# Patient Record
Sex: MALE | Race: WHITE | NOT HISPANIC OR LATINO | Employment: FULL TIME | URBAN - METROPOLITAN AREA
[De-identification: names, ages, dates, MRNs, and addresses within clinical notes are randomized per-mention and may not be internally consistent; named-entity substitution may affect disease eponyms.]

---

## 2022-12-12 ENCOUNTER — OFFICE VISIT (OUTPATIENT)
Dept: URGENT CARE | Facility: CLINIC | Age: 37
End: 2022-12-12
Payer: COMMERCIAL

## 2022-12-12 VITALS
BODY MASS INDEX: 30.07 KG/M2 | DIASTOLIC BLOOD PRESSURE: 83 MMHG | HEART RATE: 104 BPM | WEIGHT: 222 LBS | RESPIRATION RATE: 20 BRPM | HEIGHT: 72 IN | TEMPERATURE: 99 F | OXYGEN SATURATION: 96 % | SYSTOLIC BLOOD PRESSURE: 116 MMHG

## 2022-12-12 DIAGNOSIS — M25.552 LEFT HIP PAIN: Primary | ICD-10-CM

## 2022-12-12 PROCEDURE — 1160F RVW MEDS BY RX/DR IN RCRD: CPT | Mod: CPTII,S$GLB,, | Performed by: PHYSICIAN ASSISTANT

## 2022-12-12 PROCEDURE — 3008F PR BODY MASS INDEX (BMI) DOCUMENTED: ICD-10-PCS | Mod: CPTII,S$GLB,, | Performed by: PHYSICIAN ASSISTANT

## 2022-12-12 PROCEDURE — 1160F PR REVIEW ALL MEDS BY PRESCRIBER/CLIN PHARMACIST DOCUMENTED: ICD-10-PCS | Mod: CPTII,S$GLB,, | Performed by: PHYSICIAN ASSISTANT

## 2022-12-12 PROCEDURE — 96372 THER/PROPH/DIAG INJ SC/IM: CPT | Mod: S$GLB,,, | Performed by: PHYSICIAN ASSISTANT

## 2022-12-12 PROCEDURE — 3079F PR MOST RECENT DIASTOLIC BLOOD PRESSURE 80-89 MM HG: ICD-10-PCS | Mod: CPTII,S$GLB,, | Performed by: PHYSICIAN ASSISTANT

## 2022-12-12 PROCEDURE — 99204 OFFICE O/P NEW MOD 45 MIN: CPT | Mod: 25,S$GLB,, | Performed by: PHYSICIAN ASSISTANT

## 2022-12-12 PROCEDURE — 99204 PR OFFICE/OUTPT VISIT, NEW, LEVL IV, 45-59 MIN: ICD-10-PCS | Mod: 25,S$GLB,, | Performed by: PHYSICIAN ASSISTANT

## 2022-12-12 PROCEDURE — 3079F DIAST BP 80-89 MM HG: CPT | Mod: CPTII,S$GLB,, | Performed by: PHYSICIAN ASSISTANT

## 2022-12-12 PROCEDURE — 1159F MED LIST DOCD IN RCRD: CPT | Mod: CPTII,S$GLB,, | Performed by: PHYSICIAN ASSISTANT

## 2022-12-12 PROCEDURE — 3074F PR MOST RECENT SYSTOLIC BLOOD PRESSURE < 130 MM HG: ICD-10-PCS | Mod: CPTII,S$GLB,, | Performed by: PHYSICIAN ASSISTANT

## 2022-12-12 PROCEDURE — 3074F SYST BP LT 130 MM HG: CPT | Mod: CPTII,S$GLB,, | Performed by: PHYSICIAN ASSISTANT

## 2022-12-12 PROCEDURE — 96372 PR INJECTION,THERAP/PROPH/DIAG2ST, IM OR SUBCUT: ICD-10-PCS | Mod: S$GLB,,, | Performed by: PHYSICIAN ASSISTANT

## 2022-12-12 PROCEDURE — 1159F PR MEDICATION LIST DOCUMENTED IN MEDICAL RECORD: ICD-10-PCS | Mod: CPTII,S$GLB,, | Performed by: PHYSICIAN ASSISTANT

## 2022-12-12 PROCEDURE — 3008F BODY MASS INDEX DOCD: CPT | Mod: CPTII,S$GLB,, | Performed by: PHYSICIAN ASSISTANT

## 2022-12-12 RX ORDER — KETOROLAC TROMETHAMINE 30 MG/ML
30 INJECTION, SOLUTION INTRAMUSCULAR; INTRAVENOUS
Status: COMPLETED | OUTPATIENT
Start: 2022-12-12 | End: 2022-12-12

## 2022-12-12 RX ORDER — METHOCARBAMOL 750 MG/1
750 TABLET, FILM COATED ORAL 3 TIMES DAILY
Qty: 30 TABLET | Refills: 0 | Status: SHIPPED | OUTPATIENT
Start: 2022-12-12 | End: 2022-12-22

## 2022-12-12 RX ORDER — KETOROLAC TROMETHAMINE 10 MG/1
10 TABLET, FILM COATED ORAL 2 TIMES DAILY
Qty: 10 TABLET | Refills: 0 | Status: SHIPPED | OUTPATIENT
Start: 2022-12-12 | End: 2022-12-16 | Stop reason: ALTCHOICE

## 2022-12-12 RX ORDER — PREDNISONE 50 MG/1
50 TABLET ORAL DAILY
Qty: 5 TABLET | Refills: 0 | Status: SHIPPED | OUTPATIENT
Start: 2022-12-12 | End: 2023-03-29

## 2022-12-12 RX ADMIN — KETOROLAC TROMETHAMINE 30 MG: 30 INJECTION, SOLUTION INTRAMUSCULAR; INTRAVENOUS at 03:12

## 2022-12-12 NOTE — PROGRESS NOTES
Subjective:       Patient ID: Ty Ruelas is a 37 y.o. male.    Vitals:  height is 6' (1.829 m) and weight is 100.7 kg (222 lb). His temperature is 98.9 °F (37.2 °C). His blood pressure is 116/83 and his pulse is 104. His respiration is 20 and oxygen saturation is 96%.     Chief Complaint: Pain    C/o left side pain, rates pain 6/10, x 1 month, pain starts left side buttocks and generates down the leg, pt has taken OTC meds with little to no help.  He has had negative MRI and nerve conduction study.  States he can't get in to see his PCP until after the first of the year    Pain  This is a recurrent problem. The current episode started 1 to 4 weeks ago. The problem occurs constantly. The problem has been unchanged. Pertinent negatives include no abdominal pain, anorexia, arthralgias, change in bowel habit, chest pain, chills, congestion, coughing, diaphoresis, fatigue, fever, headaches, joint swelling, myalgias, nausea, neck pain, numbness, rash, sore throat, swollen glands, urinary symptoms, vertigo, visual change, vomiting or weakness. He has tried NSAIDs for the symptoms. The treatment provided no relief.     Constitution: Negative for chills, sweating, fatigue and fever.   HENT:  Negative for congestion and sore throat.    Neck: Negative for neck pain.   Cardiovascular:  Negative for chest pain.   Respiratory:  Negative for cough.    Gastrointestinal:  Negative for abdominal pain, nausea and vomiting.   Musculoskeletal:  Negative for joint pain, joint swelling and muscle ache.   Skin:  Negative for rash.   Neurological:  Negative for history of vertigo, headaches and numbness.     Objective:      Physical Exam   Constitutional: He is oriented to person, place, and time. He appears well-developed. He is cooperative.  Non-toxic appearance. He does not appear ill. No distress.   HENT:   Head: Normocephalic and atraumatic.   Ears:   Right Ear: Hearing, tympanic membrane, external ear and ear canal normal.   Left  Ear: Hearing, tympanic membrane, external ear and ear canal normal.   Nose: Nose normal. No mucosal edema, rhinorrhea or nasal deformity. No epistaxis. Right sinus exhibits no maxillary sinus tenderness and no frontal sinus tenderness. Left sinus exhibits no maxillary sinus tenderness and no frontal sinus tenderness.   Mouth/Throat: Uvula is midline, oropharynx is clear and moist and mucous membranes are normal. No trismus in the jaw. Normal dentition. No uvula swelling. No posterior oropharyngeal erythema.   Eyes: Conjunctivae and lids are normal. Right eye exhibits no discharge. Left eye exhibits no discharge. No scleral icterus.   Neck: Trachea normal and phonation normal. Neck supple.   Cardiovascular: Normal rate, regular rhythm, normal heart sounds and normal pulses.   Pulmonary/Chest: Effort normal and breath sounds normal. No respiratory distress.   Abdominal: Normal appearance and bowel sounds are normal. He exhibits no distension and no mass. Soft. There is no abdominal tenderness.   Musculoskeletal: Normal range of motion.         General: No deformity. Normal range of motion.      Left hip: He exhibits normal range of motion, no tenderness, no bony tenderness, no crepitus and no deformity.   Neurological: He is alert and oriented to person, place, and time. He exhibits normal muscle tone. Coordination normal.   Skin: Skin is warm, dry, intact, not diaphoretic and not pale.   Psychiatric: His speech is normal and behavior is normal. Judgment and thought content normal.   Nursing note and vitals reviewed.      Assessment:       1. Left hip pain          Plan:       Discussed medication being prescribed.  Advised patient to follow up with PCP as needed.  Patient verbalized understanding, agrees with the plan, and is comfortable with discharge.    Left hip pain  -     ketorolac injection 30 mg  -     methocarbamoL (ROBAXIN) 750 MG Tab; Take 1 tablet (750 mg total) by mouth 3 (three) times daily. for 10 days   Dispense: 30 tablet; Refill: 0  -     predniSONE (DELTASONE) 50 MG Tab; Take 1 tablet (50 mg total) by mouth once daily.  Dispense: 5 tablet; Refill: 0  -     ketorolac (TORADOL) 10 mg tablet; Take 1 tablet (10 mg total) by mouth 2 (two) times a day. for 5 days  Dispense: 10 tablet; Refill: 0  -     Ambulatory referral/consult to Rheumatology

## 2022-12-16 ENCOUNTER — HOSPITAL ENCOUNTER (OUTPATIENT)
Dept: RADIOLOGY | Facility: HOSPITAL | Age: 37
Discharge: HOME | End: 2022-12-16
Attending: PHYSICIAN ASSISTANT
Payer: COMMERCIAL

## 2022-12-16 ENCOUNTER — OFFICE VISIT (OUTPATIENT)
Dept: RHEUMATOLOGY | Facility: CLINIC | Age: 37
End: 2022-12-16
Payer: COMMERCIAL

## 2022-12-16 ENCOUNTER — PATIENT MESSAGE (OUTPATIENT)
Dept: RHEUMATOLOGY | Facility: CLINIC | Age: 37
End: 2022-12-16

## 2022-12-16 VITALS
SYSTOLIC BLOOD PRESSURE: 135 MMHG | BODY MASS INDEX: 30.52 KG/M2 | WEIGHT: 225.31 LBS | HEART RATE: 108 BPM | HEIGHT: 72 IN | DIASTOLIC BLOOD PRESSURE: 81 MMHG

## 2022-12-16 DIAGNOSIS — M53.3 CHRONIC LEFT SI JOINT PAIN: ICD-10-CM

## 2022-12-16 DIAGNOSIS — L40.0 PLAQUE PSORIASIS: ICD-10-CM

## 2022-12-16 DIAGNOSIS — G89.29 CHRONIC LEFT SI JOINT PAIN: ICD-10-CM

## 2022-12-16 DIAGNOSIS — M25.552 LEFT HIP PAIN: Primary | ICD-10-CM

## 2022-12-16 DIAGNOSIS — M25.552 LEFT HIP PAIN: ICD-10-CM

## 2022-12-16 PROCEDURE — 73502 XR HIP WITH PELVIS WHEN PERFORMED, 2 OR 3 VIEWS LEFT: ICD-10-PCS | Mod: 26,LT,, | Performed by: RADIOLOGY

## 2022-12-16 PROCEDURE — 99999 PR PBB SHADOW E&M-EST. PATIENT-LVL IV: CPT | Mod: PBBFAC,,, | Performed by: PHYSICIAN ASSISTANT

## 2022-12-16 PROCEDURE — 72202 X-RAY EXAM SI JOINTS 3/> VWS: CPT | Mod: 26,,, | Performed by: RADIOLOGY

## 2022-12-16 PROCEDURE — 72202 XR SACROILIAC JOINTS COMPLETE: ICD-10-PCS | Mod: 26,,, | Performed by: RADIOLOGY

## 2022-12-16 PROCEDURE — 3075F SYST BP GE 130 - 139MM HG: CPT | Mod: CPTII,S$GLB,, | Performed by: PHYSICIAN ASSISTANT

## 2022-12-16 PROCEDURE — 3079F PR MOST RECENT DIASTOLIC BLOOD PRESSURE 80-89 MM HG: ICD-10-PCS | Mod: CPTII,S$GLB,, | Performed by: PHYSICIAN ASSISTANT

## 2022-12-16 PROCEDURE — 99999 PR PBB SHADOW E&M-EST. PATIENT-LVL IV: ICD-10-PCS | Mod: PBBFAC,,, | Performed by: PHYSICIAN ASSISTANT

## 2022-12-16 PROCEDURE — 73502 X-RAY EXAM HIP UNI 2-3 VIEWS: CPT | Mod: 26,LT,, | Performed by: RADIOLOGY

## 2022-12-16 PROCEDURE — 3079F DIAST BP 80-89 MM HG: CPT | Mod: CPTII,S$GLB,, | Performed by: PHYSICIAN ASSISTANT

## 2022-12-16 PROCEDURE — 3008F BODY MASS INDEX DOCD: CPT | Mod: CPTII,S$GLB,, | Performed by: PHYSICIAN ASSISTANT

## 2022-12-16 PROCEDURE — 3075F PR MOST RECENT SYSTOLIC BLOOD PRESS GE 130-139MM HG: ICD-10-PCS | Mod: CPTII,S$GLB,, | Performed by: PHYSICIAN ASSISTANT

## 2022-12-16 PROCEDURE — 73502 X-RAY EXAM HIP UNI 2-3 VIEWS: CPT | Mod: TC,LT

## 2022-12-16 PROCEDURE — 3008F PR BODY MASS INDEX (BMI) DOCUMENTED: ICD-10-PCS | Mod: CPTII,S$GLB,, | Performed by: PHYSICIAN ASSISTANT

## 2022-12-16 PROCEDURE — 1160F RVW MEDS BY RX/DR IN RCRD: CPT | Mod: CPTII,S$GLB,, | Performed by: PHYSICIAN ASSISTANT

## 2022-12-16 PROCEDURE — 72202 X-RAY EXAM SI JOINTS 3/> VWS: CPT | Mod: TC

## 2022-12-16 PROCEDURE — 99203 OFFICE O/P NEW LOW 30 MIN: CPT | Mod: S$GLB,,, | Performed by: PHYSICIAN ASSISTANT

## 2022-12-16 PROCEDURE — 1159F MED LIST DOCD IN RCRD: CPT | Mod: CPTII,S$GLB,, | Performed by: PHYSICIAN ASSISTANT

## 2022-12-16 PROCEDURE — 1160F PR REVIEW ALL MEDS BY PRESCRIBER/CLIN PHARMACIST DOCUMENTED: ICD-10-PCS | Mod: CPTII,S$GLB,, | Performed by: PHYSICIAN ASSISTANT

## 2022-12-16 PROCEDURE — 99203 PR OFFICE/OUTPT VISIT, NEW, LEVL III, 30-44 MIN: ICD-10-PCS | Mod: S$GLB,,, | Performed by: PHYSICIAN ASSISTANT

## 2022-12-16 PROCEDURE — 1159F PR MEDICATION LIST DOCUMENTED IN MEDICAL RECORD: ICD-10-PCS | Mod: CPTII,S$GLB,, | Performed by: PHYSICIAN ASSISTANT

## 2022-12-16 RX ORDER — LEMBOREXANT 10 MG/1
1 TABLET, FILM COATED ORAL NIGHTLY
COMMUNITY
Start: 2022-12-06

## 2022-12-16 RX ORDER — VORTIOXETINE 20 MG/1
1 TABLET, FILM COATED ORAL
COMMUNITY
Start: 2022-12-07

## 2022-12-16 RX ORDER — ATOMOXETINE 25 MG/1
25 CAPSULE ORAL
COMMUNITY
Start: 2022-12-07

## 2022-12-16 RX ORDER — MELOXICAM 15 MG/1
15 TABLET ORAL DAILY
Qty: 30 TABLET | Refills: 2 | Status: SHIPPED | OUTPATIENT
Start: 2022-12-16 | End: 2023-03-29

## 2022-12-16 NOTE — PROGRESS NOTES
Subjective:      Patient ID: Ty Ruelas is a 37 y.o. male.    Chief Complaint: Hip Pain      HPI   Ty Ruelas  is a 37 y.o. male who comes in today for rheumatology evaluation.  Referred by [facility] for left hip pain in conjunction with plaque psoriasis.  He was diagnosed with psoriasis in the last couple of years.  He follows with an external dermatologist.  It is biopsy proven psoriasis.  Currently on a steroid cream which she says is not helpful.  He has not been back to follow-up with them.    Since July he started developing left-sided hip pain.  He points laterally as well as into the groin is to wear pain is located.  To a lesser extent has pain posteriorly in left SI joint.  About a year and some change ago he had also been diagnosed with Achilles tendinitis on the right side.  Denies nail pitting, history of psoriatic arthritis in the family as well as personal history of dactylitis.      Patient denies fevers, chills, photosensitivity, eye pain, shortness of breath, chest pain, hematuria, blood in the stool, sicca symptoms, raynauds, finger ulcerations.  Rheumatologic systems otherwise negative.    Serologies/Labs:  none  Current Treatment:  Steroid cream  Prednisone 50 mg (from [facility])  Robaxin  Toradol  Previous Treatment:   na      Current Outpatient Medications:     atomoxetine (STRATTERA) 25 MG capsule, Take 25 mg by mouth., Disp: , Rfl:     DAYVIGO 10 mg Tab, Take 1 tablet by mouth every evening., Disp: , Rfl:     methocarbamoL (ROBAXIN) 750 MG Tab, Take 1 tablet (750 mg total) by mouth 3 (three) times daily. for 10 days, Disp: 30 tablet, Rfl: 0    predniSONE (DELTASONE) 50 MG Tab, Take 1 tablet (50 mg total) by mouth once daily., Disp: 5 tablet, Rfl: 0    TRINTELLIX 20 mg Tab, Take 1 tablet by mouth., Disp: , Rfl:     meloxicam (MOBIC) 15 MG tablet, Take 1 tablet (15 mg total) by mouth once daily., Disp: 30 tablet, Rfl: 2    Past Medical History:   Diagnosis Date     Diabetes mellitus, type 2     Psoriasis      History reviewed. No pertinent family history.  Social History     Socioeconomic History    Marital status:    Tobacco Use    Smoking status: Never    Smokeless tobacco: Never   Substance and Sexual Activity    Alcohol use: Never    Drug use: Never    Sexual activity: Yes     Review of patient's allergies indicates:  No Known Allergies    Objective:   /81   Pulse 108   Ht 6' (1.829 m)   Wt 102.2 kg (225 lb 5 oz)   BMI 30.56 kg/m²     There is no immunization history on file for this patient.    Physical Exam   Constitutional: He is oriented to person, place, and time. No distress.   HENT:   Head: Normocephalic and atraumatic.   Pulmonary/Chest: Effort normal.   Musculoskeletal:         General: Normal range of motion.   Neurological: He is alert and oriented to person, place, and time.   Skin: Rash (psoriasis bilateral forearms) noted.   Psychiatric: His behavior is normal. Mood normal.   Nursing note and vitals reviewed.    No dactylitis, no enthesitis  TTP lt GTB, Left SI jt  No joint effusions   100% fist formation  No groin pain with ROM hip  Motor intact to EHL/FHL/GS/TA  Sensation intact to S/S/SPN/DPN/Tib nerve  (-) SLR test        No results found for this or any previous visit (from the past 672 hour(s)).    No results found for: TBGOLDPLUS   No results found for: HAV, HEPAIGM, HEPBIGM, HEPBCAB, HBEAG, HEPCAB       Assessment:     1. Left hip pain    2. Chronic left SI joint pain    3. Plaque psoriasis              Plan:     Ty was seen today for hip pain.    Diagnoses and all orders for this visit:    Left hip pain  -     GABBY Screen w/Reflex; Future  -     Rheumatoid Factor; Future  -     Cyclic Citrullinated Peptide Antibody, IgG; Future  -     CBC Auto Differential; Standing  -     Comprehensive Metabolic Panel; Standing  -     Sedimentation rate; Standing  -     C-Reactive Protein; Standing  -     X-Ray Hip 2 or 3 views Left (with Pelvis  when performed); Future  -     X-Ray Sacroiliac Joints Complete; Future  -     meloxicam (MOBIC) 15 MG tablet; Take 1 tablet (15 mg total) by mouth once daily.    Chronic left SI joint pain  -     CARMELINA Screen w/Reflex; Future  -     Rheumatoid Factor; Future  -     Cyclic Citrullinated Peptide Antibody, IgG; Future  -     CBC Auto Differential; Standing  -     Comprehensive Metabolic Panel; Standing  -     Sedimentation rate; Standing  -     C-Reactive Protein; Standing  -     X-Ray Hip 2 or 3 views Left (with Pelvis when performed); Future  -     X-Ray Sacroiliac Joints Complete; Future  -     meloxicam (MOBIC) 15 MG tablet; Take 1 tablet (15 mg total) by mouth once daily.    Plaque psoriasis  -     CARMELINA Screen w/Reflex; Future  -     Rheumatoid Factor; Future  -     Cyclic Citrullinated Peptide Antibody, IgG; Future  -     CBC Auto Differential; Standing  -     Comprehensive Metabolic Panel; Standing  -     Sedimentation rate; Standing  -     C-Reactive Protein; Standing        Plaque Psoriasis now w oligoarthralgias  Hip xray  today  Reg 4, carmelina, rf, ccp today  Receheck crp and esr in 3 weeks (off steroids)  Mobic 15 mg one daily (start after completion of steroid and toradol - pt to cut last prednisone in half)  Consider GTB injection  Return to clinic: 6-8 weeks    Follow up in about 6 weeks (around 1/27/2023).    The patient understands, chooses and consents to this plan and accepts all the risks which include but are not limited to the risks mentioned above.     Disclaimer: This note was prepared using a voice recognition system and is likely to have sound alike errors within the text.

## 2022-12-22 ENCOUNTER — PATIENT MESSAGE (OUTPATIENT)
Dept: RHEUMATOLOGY | Facility: CLINIC | Age: 37
End: 2022-12-22
Payer: COMMERCIAL

## 2022-12-22 DIAGNOSIS — M25.552 LEFT HIP PAIN: Primary | ICD-10-CM

## 2023-01-30 ENCOUNTER — TELEPHONE (OUTPATIENT)
Dept: RHEUMATOLOGY | Facility: CLINIC | Age: 38
End: 2023-01-30
Payer: COMMERCIAL

## 2023-01-30 NOTE — TELEPHONE ENCOUNTER
"Contacted patient to discuss this with him. Patient verbalized understanding. All questions answered.     Umm Cruz (Allye), [facility]  Rheumatology Department   "

## 2023-01-30 NOTE — TELEPHONE ENCOUNTER
"Returned patients phone call. Patient stated that he is scheduled to see Mrs. Lou Lott" SANA Cotton today 01/30/2023 at 3:00 at [facility] location. He was diagnosed with COVID at the beginning of this year and had to cancel his MRI and Lab work. He is rescheduled for his MRI and labs tomorrow and wanted to know if he should still keep his appointment today with Mrs. Blake"Manoj Cotton PA-C since those were not done. Advised patient that Mrs. Lou Dawkinsnolvia Cotton PA-C needed to have those things done prior to his next appointment so I am going to send her a message to let her know what is going on. Patient thanked me for the return phone call. All questions answered.        Sonali, this patient is scheduled to see you at 3:00 today but did not have labs or his MRI due to him having COVID. MRI and labs are rescheduled to tomorrow. Your next available is not until 02/27/2023. Please advise.      Umm Cruz (Allye), [facility]  Rheumatology Department    "

## 2023-01-30 NOTE — TELEPHONE ENCOUNTER
Lou Cotton, SANA Cruz, [facility]  Caller: Unspecified (Today, 10:23 AM)  Schedule first available and add him to the cancelation list

## 2023-01-30 NOTE — TELEPHONE ENCOUNTER
----- Message from Yoly Hickey sent at 1/30/2023 10:18 AM CST -----  Contact: Ty Crawford is calling to speak to the nurse regarding his appointment scheduled for today 01/30. Before he come he have a few questions. Please contact the patient at [phone]    Thanks  FIDELIA

## 2023-01-31 ENCOUNTER — HOSPITAL ENCOUNTER (OUTPATIENT)
Dept: RADIOLOGY | Facility: HOSPITAL | Age: 38
Discharge: HOME | End: 2023-01-31
Attending: PHYSICIAN ASSISTANT
Payer: COMMERCIAL

## 2023-01-31 DIAGNOSIS — M25.552 LEFT HIP PAIN: ICD-10-CM

## 2023-01-31 PROCEDURE — 73723 MRI HIP W WO CONTRAST LEFT: ICD-10-PCS | Mod: 26,LT,, | Performed by: RADIOLOGY

## 2023-01-31 PROCEDURE — 25500020 PHARM REV CODE 255: Performed by: PHYSICIAN ASSISTANT

## 2023-01-31 PROCEDURE — A9585 GADOBUTROL INJECTION: HCPCS | Performed by: PHYSICIAN ASSISTANT

## 2023-01-31 PROCEDURE — 73723 MRI JOINT LWR EXTR W/O&W/DYE: CPT | Mod: 26,LT,, | Performed by: RADIOLOGY

## 2023-01-31 PROCEDURE — 73723 MRI JOINT LWR EXTR W/O&W/DYE: CPT | Mod: TC,LT

## 2023-01-31 RX ORDER — GADOBUTROL 604.72 MG/ML
10 INJECTION INTRAVENOUS
Status: COMPLETED | OUTPATIENT
Start: 2023-01-31 | End: 2023-01-31

## 2023-01-31 RX ADMIN — GADOBUTROL 10 ML: 604.72 INJECTION INTRAVENOUS at 02:01

## 2023-02-17 ENCOUNTER — PATIENT MESSAGE (OUTPATIENT)
Dept: RHEUMATOLOGY | Facility: CLINIC | Age: 38
End: 2023-02-17
Payer: COMMERCIAL

## 2023-02-27 ENCOUNTER — OFFICE VISIT (OUTPATIENT)
Dept: RHEUMATOLOGY | Facility: CLINIC | Age: 38
End: 2023-02-27
Payer: COMMERCIAL

## 2023-02-27 ENCOUNTER — LAB VISIT (OUTPATIENT)
Dept: LAB | Facility: HOSPITAL | Age: 38
End: 2023-02-27
Attending: PHYSICIAN ASSISTANT
Payer: COMMERCIAL

## 2023-02-27 VITALS
SYSTOLIC BLOOD PRESSURE: 110 MMHG | HEART RATE: 78 BPM | DIASTOLIC BLOOD PRESSURE: 74 MMHG | WEIGHT: 227.94 LBS | BODY MASS INDEX: 30.87 KG/M2 | HEIGHT: 72 IN

## 2023-02-27 DIAGNOSIS — M25.552 LEFT HIP PAIN: ICD-10-CM

## 2023-02-27 DIAGNOSIS — E11.69 TYPE 2 DIABETES MELLITUS WITH OTHER SPECIFIED COMPLICATION, WITHOUT LONG-TERM CURRENT USE OF INSULIN: ICD-10-CM

## 2023-02-27 DIAGNOSIS — Z79.899 HIGH RISK MEDICATION USE: ICD-10-CM

## 2023-02-27 DIAGNOSIS — G89.29 CHRONIC LEFT SI JOINT PAIN: ICD-10-CM

## 2023-02-27 DIAGNOSIS — L40.0 PLAQUE PSORIASIS: ICD-10-CM

## 2023-02-27 DIAGNOSIS — M53.3 CHRONIC LEFT SI JOINT PAIN: ICD-10-CM

## 2023-02-27 DIAGNOSIS — L40.50 PSORIATIC ARTHRITIS: Primary | ICD-10-CM

## 2023-02-27 LAB
ALBUMIN SERPL BCP-MCNC: 4.3 G/DL (ref 3.5–5.2)
ALP SERPL-CCNC: 66 U/L (ref 55–135)
ALT SERPL W/O P-5'-P-CCNC: 28 U/L (ref 10–44)
ANION GAP SERPL CALC-SCNC: 11 MMOL/L (ref 8–16)
AST SERPL-CCNC: 17 U/L (ref 10–40)
BASOPHILS # BLD AUTO: 0.03 K/UL (ref 0–0.2)
BASOPHILS NFR BLD: 0.5 % (ref 0–1.9)
BILIRUB SERPL-MCNC: 0.6 MG/DL (ref 0.1–1)
BUN SERPL-MCNC: 9 MG/DL (ref 6–20)
CALCIUM SERPL-MCNC: 10 MG/DL (ref 8.7–10.5)
CHLORIDE SERPL-SCNC: 102 MMOL/L (ref 95–110)
CO2 SERPL-SCNC: 25 MMOL/L (ref 23–29)
CREAT SERPL-MCNC: 1 MG/DL (ref 0.5–1.4)
CRP SERPL-MCNC: 3.3 MG/L (ref 0–8.2)
DIFFERENTIAL METHOD: NORMAL
EOSINOPHIL # BLD AUTO: 0.1 K/UL (ref 0–0.5)
EOSINOPHIL NFR BLD: 1.8 % (ref 0–8)
ERYTHROCYTE [DISTWIDTH] IN BLOOD BY AUTOMATED COUNT: 12.3 % (ref 11.5–14.5)
ERYTHROCYTE [SEDIMENTATION RATE] IN BLOOD BY PHOTOMETRIC METHOD: 4 MM/HR (ref 0–23)
EST. GFR  (NO RACE VARIABLE): >60 ML/MIN/1.73 M^2
ESTIMATED AVG GLUCOSE: 214 MG/DL (ref 68–131)
GLUCOSE SERPL-MCNC: 293 MG/DL (ref 70–110)
HBA1C MFR BLD: 9.1 % (ref 4–5.6)
HCT VFR BLD AUTO: 45.3 % (ref 40–54)
HGB BLD-MCNC: 15.3 G/DL (ref 14–18)
IMM GRANULOCYTES # BLD AUTO: 0.01 K/UL (ref 0–0.04)
IMM GRANULOCYTES NFR BLD AUTO: 0.2 % (ref 0–0.5)
LYMPHOCYTES # BLD AUTO: 2.3 K/UL (ref 1–4.8)
LYMPHOCYTES NFR BLD: 36.4 % (ref 18–48)
MCH RBC QN AUTO: 29 PG (ref 27–31)
MCHC RBC AUTO-ENTMCNC: 33.8 G/DL (ref 32–36)
MCV RBC AUTO: 86 FL (ref 82–98)
MONOCYTES # BLD AUTO: 0.4 K/UL (ref 0.3–1)
MONOCYTES NFR BLD: 6.7 % (ref 4–15)
NEUTROPHILS # BLD AUTO: 3.4 K/UL (ref 1.8–7.7)
NEUTROPHILS NFR BLD: 54.4 % (ref 38–73)
NRBC BLD-RTO: 0 /100 WBC
PLATELET # BLD AUTO: 249 K/UL (ref 150–450)
PMV BLD AUTO: 10.3 FL (ref 9.2–12.9)
POTASSIUM SERPL-SCNC: 4.6 MMOL/L (ref 3.5–5.1)
PROT SERPL-MCNC: 7.6 G/DL (ref 6–8.4)
RBC # BLD AUTO: 5.28 M/UL (ref 4.6–6.2)
SODIUM SERPL-SCNC: 138 MMOL/L (ref 136–145)
WBC # BLD AUTO: 6.26 K/UL (ref 3.9–12.7)

## 2023-02-27 PROCEDURE — 3078F DIAST BP <80 MM HG: CPT | Mod: CPTII,S$GLB,, | Performed by: PHYSICIAN ASSISTANT

## 2023-02-27 PROCEDURE — 1159F PR MEDICATION LIST DOCUMENTED IN MEDICAL RECORD: ICD-10-PCS | Mod: CPTII,S$GLB,, | Performed by: PHYSICIAN ASSISTANT

## 2023-02-27 PROCEDURE — 36415 COLL VENOUS BLD VENIPUNCTURE: CPT | Performed by: PHYSICIAN ASSISTANT

## 2023-02-27 PROCEDURE — 99215 OFFICE O/P EST HI 40 MIN: CPT | Mod: S$GLB,,, | Performed by: PHYSICIAN ASSISTANT

## 2023-02-27 PROCEDURE — 85652 RBC SED RATE AUTOMATED: CPT | Performed by: PHYSICIAN ASSISTANT

## 2023-02-27 PROCEDURE — 86140 C-REACTIVE PROTEIN: CPT | Performed by: PHYSICIAN ASSISTANT

## 2023-02-27 PROCEDURE — 83036 HEMOGLOBIN GLYCOSYLATED A1C: CPT | Performed by: PHYSICIAN ASSISTANT

## 2023-02-27 PROCEDURE — 1159F MED LIST DOCD IN RCRD: CPT | Mod: CPTII,S$GLB,, | Performed by: PHYSICIAN ASSISTANT

## 2023-02-27 PROCEDURE — 3008F PR BODY MASS INDEX (BMI) DOCUMENTED: ICD-10-PCS | Mod: CPTII,S$GLB,, | Performed by: PHYSICIAN ASSISTANT

## 2023-02-27 PROCEDURE — 3074F SYST BP LT 130 MM HG: CPT | Mod: CPTII,S$GLB,, | Performed by: PHYSICIAN ASSISTANT

## 2023-02-27 PROCEDURE — 3074F PR MOST RECENT SYSTOLIC BLOOD PRESSURE < 130 MM HG: ICD-10-PCS | Mod: CPTII,S$GLB,, | Performed by: PHYSICIAN ASSISTANT

## 2023-02-27 PROCEDURE — 99215 PR OFFICE/OUTPT VISIT, EST, LEVL V, 40-54 MIN: ICD-10-PCS | Mod: S$GLB,,, | Performed by: PHYSICIAN ASSISTANT

## 2023-02-27 PROCEDURE — 85025 COMPLETE CBC W/AUTO DIFF WBC: CPT | Performed by: PHYSICIAN ASSISTANT

## 2023-02-27 PROCEDURE — 99999 PR PBB SHADOW E&M-EST. PATIENT-LVL III: ICD-10-PCS | Mod: PBBFAC,,, | Performed by: PHYSICIAN ASSISTANT

## 2023-02-27 PROCEDURE — 3078F PR MOST RECENT DIASTOLIC BLOOD PRESSURE < 80 MM HG: ICD-10-PCS | Mod: CPTII,S$GLB,, | Performed by: PHYSICIAN ASSISTANT

## 2023-02-27 PROCEDURE — 1160F RVW MEDS BY RX/DR IN RCRD: CPT | Mod: CPTII,S$GLB,, | Performed by: PHYSICIAN ASSISTANT

## 2023-02-27 PROCEDURE — 1160F PR REVIEW ALL MEDS BY PRESCRIBER/CLIN PHARMACIST DOCUMENTED: ICD-10-PCS | Mod: CPTII,S$GLB,, | Performed by: PHYSICIAN ASSISTANT

## 2023-02-27 PROCEDURE — 3008F BODY MASS INDEX DOCD: CPT | Mod: CPTII,S$GLB,, | Performed by: PHYSICIAN ASSISTANT

## 2023-02-27 PROCEDURE — 99999 PR PBB SHADOW E&M-EST. PATIENT-LVL III: CPT | Mod: PBBFAC,,, | Performed by: PHYSICIAN ASSISTANT

## 2023-02-27 PROCEDURE — 80053 COMPREHEN METABOLIC PANEL: CPT | Performed by: PHYSICIAN ASSISTANT

## 2023-02-27 RX ORDER — BLOOD-GLUCOSE METER
EACH MISCELLANEOUS
COMMUNITY
Start: 2022-12-19

## 2023-02-27 RX ORDER — FOLIC ACID 1 MG/1
1 TABLET ORAL DAILY
Qty: 30 TABLET | Refills: 3 | Status: SHIPPED | OUTPATIENT
Start: 2023-02-27 | End: 2024-02-27

## 2023-02-27 RX ORDER — METHOTREXATE 2.5 MG/1
TABLET ORAL
Qty: 32 TABLET | Refills: 1 | Status: SHIPPED | OUTPATIENT
Start: 2023-02-27 | End: 2023-03-29

## 2023-02-27 RX ORDER — METHOTREXATE 2.5 MG/1
TABLET ORAL
Qty: 20 TABLET | Refills: 0 | Status: SHIPPED | OUTPATIENT
Start: 2023-02-27 | End: 2023-03-29

## 2023-02-27 NOTE — PROGRESS NOTES
Subjective:      Patient ID: Ty Ruelas is a 37 y.o. male.    Chief Complaint: Pain      HPI   Ty Ruelas  is a 37 y.o. male who comes in today for f/u.  Initially referred by [facility] for left hip pain in conjunction with plaque psoriasis.  He was diagnosed with psoriasis in the last couple of years.  He follows with an external dermatologist.  It is biopsy proven psoriasis.  Currently on a steroid cream which he says is not helpful.  He has not been back to follow-up with them.    Since July he started developing left-sided hip pain.  He points laterally as well as into the groin is to wear pain is located.  To a lesser extent has pain posteriorly in left SI joint.  About a year and some change ago he had also been diagnosed with Achilles tendinitis on the right side.  No nail dystrophy.  No personal history of dactylitis.  He does have active psoriasis today.    MRI of the left hip showed synovitis and tenosynovitis of the gluteal hamstring tendon.  Previously had elevations of CRP which improved upon completion of a steroid.    Patient denies fevers, chills, photosensitivity, eye pain, shortness of breath, chest pain, hematuria, blood in the stool, sicca symptoms, raynauds, finger ulcerations.  Rheumatologic systems otherwise negative.    Serologies/Labs:  none  Current Treatment:  Steroid cream  Prednisone 50 mg (from )  Robaxin  Toradol  Previous Treatment:   na      Current Outpatient Medications:     ACCU-CHEK GUIDE GLUCOSE METER [facility], USE TO CHECK BLOOD SUGAR, Disp: , Rfl:     atomoxetine (STRATTERA) 25 MG capsule, Take 25 mg by mouth., Disp: , Rfl:     DAYVIGO 10 mg Tab, Take 1 tablet by mouth every evening., Disp: , Rfl:     TRINTELLIX 20 mg Tab, Take 1 tablet by mouth., Disp: , Rfl:     folic acid (FOLVITE) 1 MG tablet, Take 1 tablet (1 mg total) by mouth once daily., Disp: 30 tablet, Rfl: 3    meloxicam (MOBIC) 15 MG tablet, Take 1 tablet (15 mg total) by mouth once  daily. (Patient not taking: Reported on 2/27/2023), Disp: 30 tablet, Rfl: 2    methotrexate 2.5 MG Tab, Take 4 tablets (total of 10 mg) every 7 days x 2 doses, then increase to 15 mg every 7 days x 2 doses, then increase to 20 mg weekly, Disp: 20 tablet, Rfl: 0    methotrexate 2.5 MG Tab, 4 PO (total of 10 mg) every 7 days in the am and 4 PO (total of 10 mg) every 7 days in the pm the same day, Disp: 32 tablet, Rfl: 1    predniSONE (DELTASONE) 50 MG Tab, Take 1 tablet (50 mg total) by mouth once daily. (Patient not taking: Reported on 2/27/2023), Disp: 5 tablet, Rfl: 0    Past Medical History:   Diagnosis Date    Diabetes mellitus, type 2     Psoriasis      History reviewed. No pertinent family history.  Social History     Socioeconomic History    Marital status:    Tobacco Use    Smoking status: Never    Smokeless tobacco: Never   Substance and Sexual Activity    Alcohol use: Never    Drug use: Never    Sexual activity: Yes     Review of patient's allergies indicates:  No Known Allergies    Objective:   /74   Pulse 78   Ht 6' (1.829 m)   Wt 103.4 kg (227 lb 15.3 oz)   BMI 30.92 kg/m²     There is no immunization history on file for this patient.    Physical Exam   Constitutional: He is oriented to person, place, and time. No distress.   HENT:   Head: Normocephalic and atraumatic.   Pulmonary/Chest: Effort normal.   Musculoskeletal:         General: Normal range of motion.   Neurological: He is alert and oriented to person, place, and time.   Skin: Rash (psoriasis bilateral forearms) noted.   Psychiatric: His behavior is normal. Mood normal.   Nursing note and vitals reviewed.    No dactylitis, no enthesitis  TTP lt GTB, Left SI jt  No joint effusions   100% fist formation  No groin pain with ROM hip  Motor intact to EHL/FHL/GS/TA  Sensation intact to S/S/SPN/DPN/Tib nerve  (-) SLR test  Tender to palpation at the hamstring origin over the ischial tuberosity        Recent Results (from the past 672  hour(s))   CBC Auto Differential    Collection Time: 01/31/23  1:57 PM   Result Value Ref Range    WBC 7.73 3.90 - 12.70 K/uL    RBC 5.07 4.60 - 6.20 M/uL    Hemoglobin 14.7 14.0 - 18.0 g/dL    Hematocrit 42.4 40.0 - 54.0 %    MCV 84 82 - 98 fL    MCH 29.0 27.0 - 31.0 pg    MCHC 34.7 32.0 - 36.0 g/dL    RDW 11.9 11.5 - 14.5 %    Platelets 223 150 - 450 K/uL    MPV 10.1 9.2 - 12.9 fL    Immature Granulocytes 0.4 0.0 - 0.5 %    Gran # (ANC) 4.5 1.8 - 7.7 K/uL    Immature Grans (Abs) 0.03 0.00 - 0.04 K/uL    Lymph # 2.5 1.0 - 4.8 K/uL    Mono # 0.5 0.3 - 1.0 K/uL    Eos # 0.2 0.0 - 0.5 K/uL    Baso # 0.04 0.00 - 0.20 K/uL    nRBC 0 0 /100 WBC    Gran % 58.3 38.0 - 73.0 %    Lymph % 32.3 18.0 - 48.0 %    Mono % 5.8 4.0 - 15.0 %    Eosinophil % 2.7 0.0 - 8.0 %    Basophil % 0.5 0.0 - 1.9 %    Differential Method Automated    Comprehensive Metabolic Panel    Collection Time: 01/31/23  1:57 PM   Result Value Ref Range    Sodium 137 136 - 145 mmol/L    Potassium 4.2 3.5 - 5.1 mmol/L    Chloride 99 95 - 110 mmol/L    CO2 25 23 - 29 mmol/L    Glucose 251 (H) 70 - 110 mg/dL    BUN 12 6 - 20 mg/dL    Creatinine 0.9 0.5 - 1.4 mg/dL    Calcium 9.5 8.7 - 10.5 mg/dL    Total Protein 7.0 6.0 - 8.4 g/dL    Albumin 4.1 3.5 - 5.2 g/dL    Total Bilirubin 0.5 0.1 - 1.0 mg/dL    Alkaline Phosphatase 71 55 - 135 U/L    AST 17 10 - 40 U/L    ALT 30 10 - 44 U/L    Anion Gap 13 8 - 16 mmol/L    eGFR >60 >60 mL/min/1.73 m^2   Sedimentation rate    Collection Time: 01/31/23  1:57 PM   Result Value Ref Range    Sed Rate 8 0 - 23 mm/Hr   C-Reactive Protein    Collection Time: 01/31/23  1:57 PM   Result Value Ref Range    CRP 3.7 0.0 - 8.2 mg/L   CBC Auto Differential    Collection Time: 02/27/23  9:41 AM   Result Value Ref Range    WBC 6.26 3.90 - 12.70 K/uL    RBC 5.28 4.60 - 6.20 M/uL    Hemoglobin 15.3 14.0 - 18.0 g/dL    Hematocrit 45.3 40.0 - 54.0 %    MCV 86 82 - 98 fL    MCH 29.0 27.0 - 31.0 pg    MCHC 33.8 32.0 - 36.0 g/dL    RDW 12.3  11.5 - 14.5 %    Platelets 249 150 - 450 K/uL    MPV 10.3 9.2 - 12.9 fL    Immature Granulocytes 0.2 0.0 - 0.5 %    Gran # (ANC) 3.4 1.8 - 7.7 K/uL    Immature Grans (Abs) 0.01 0.00 - 0.04 K/uL    Lymph # 2.3 1.0 - 4.8 K/uL    Mono # 0.4 0.3 - 1.0 K/uL    Eos # 0.1 0.0 - 0.5 K/uL    Baso # 0.03 0.00 - 0.20 K/uL    nRBC 0 0 /100 WBC    Gran % 54.4 38.0 - 73.0 %    Lymph % 36.4 18.0 - 48.0 %    Mono % 6.7 4.0 - 15.0 %    Eosinophil % 1.8 0.0 - 8.0 %    Basophil % 0.5 0.0 - 1.9 %    Differential Method Automated    Comprehensive Metabolic Panel    Collection Time: 02/27/23  9:41 AM   Result Value Ref Range    Sodium 138 136 - 145 mmol/L    Potassium 4.6 3.5 - 5.1 mmol/L    Chloride 102 95 - 110 mmol/L    CO2 25 23 - 29 mmol/L    Glucose 293 (H) 70 - 110 mg/dL    BUN 9 6 - 20 mg/dL    Creatinine 1.0 0.5 - 1.4 mg/dL    Calcium 10.0 8.7 - 10.5 mg/dL    Total Protein 7.6 6.0 - 8.4 g/dL    Albumin 4.3 3.5 - 5.2 g/dL    Total Bilirubin 0.6 0.1 - 1.0 mg/dL    Alkaline Phosphatase 66 55 - 135 U/L    AST 17 10 - 40 U/L    ALT 28 10 - 44 U/L    Anion Gap 11 8 - 16 mmol/L    eGFR >60 >60 mL/min/1.73 m^2   C-Reactive Protein    Collection Time: 02/27/23  9:41 AM   Result Value Ref Range    CRP 3.3 0.0 - 8.2 mg/L       No results found for: TBGOLDPLUS   No results found for: HAV, HEPAIGM, HEPBIGM, HEPBCAB, HBEAG, HEPCAB       Assessment:     1. Psoriatic arthritis    2. Plaque psoriasis    3. Type 2 diabetes mellitus with other specified complication, without long-term current use of insulin    4. High risk medication use              Plan:     Ty was seen today for pain.    Diagnoses and all orders for this visit:    Psoriatic arthritis  -     methotrexate 2.5 MG Tab; Take 4 tablets (total of 10 mg) every 7 days x 2 doses, then increase to 15 mg every 7 days x 2 doses, then increase to 20 mg weekly  -     methotrexate 2.5 MG Tab; 4 PO (total of 10 mg) every 7 days in the am and 4 PO (total of 10 mg) every 7 days in the pm the  same day  -     folic acid (FOLVITE) 1 MG tablet; Take 1 tablet (1 mg total) by mouth once daily.    Plaque psoriasis  -     methotrexate 2.5 MG Tab; Take 4 tablets (total of 10 mg) every 7 days x 2 doses, then increase to 15 mg every 7 days x 2 doses, then increase to 20 mg weekly  -     methotrexate 2.5 MG Tab; 4 PO (total of 10 mg) every 7 days in the am and 4 PO (total of 10 mg) every 7 days in the pm the same day  -     folic acid (FOLVITE) 1 MG tablet; Take 1 tablet (1 mg total) by mouth once daily.    Type 2 diabetes mellitus with other specified complication, without long-term current use of insulin  -     Hemoglobin A1C; Future    High risk medication use        Plaque Psoriasis now w PsA (new diagnosis)  [place] Criteria  Active plaque psoriasis  Neg RF  Elevated CRP previously - improved on steroids  Am stiffness > 1 hr  MRI showing tenosynovitis left glute  DC mobic  Methotrexate 10 mg every 7 days x 2 doses, then x2 weeks then 15 mg every 7 days x2 doses then increase to 20 mg every 7 days split dose  Discussed risks and benefits of medication  Literature given to the patient  Folic acid 1 mg daily  Consider switching to Cosentyx if patient can not tolerate methotrexate or he fails to improve after titration up on methotrexate  DM - previously uncontrolled - check A1c today  Drug therapy requiring intensive monitoring for toxicity  High Risk Medication Monitoring encounter  No current medication related issues, no evidence of toxicity  I ordered labs for toxicity monitoring, have personally reviewed the findings, and discussed them with the patient.  Pending labs will be sent via the portal  Compromised immune system secondary to autoimmune disease and/or use of immunosuppressive drugs.  Monitor carefully for infections.  Advised patient to get immediate medical care if any infection arises.  Also advised strict adherence age-appropriate vaccinations and cancer screenings with PCP.  Patient advised to  hold DMARD and/or biologic therapy for signs of infection or for surgery. If you are unsure what to do please call our office for instruction.[facility] [phone]  Return to clinic: 5-6 weeks, reg 4 in 1 mos    No follow-ups on file.    The patient understands, chooses and consents to this plan and accepts all the risks which include but are not limited to the risks mentioned above.     Disclaimer: This note was prepared using a voice recognition system and is likely to have sound alike errors within the text.

## 2023-03-21 ENCOUNTER — PATIENT MESSAGE (OUTPATIENT)
Dept: RHEUMATOLOGY | Facility: CLINIC | Age: 38
End: 2023-03-21
Payer: COMMERCIAL

## 2023-03-21 ENCOUNTER — LAB VISIT (OUTPATIENT)
Dept: LAB | Facility: HOSPITAL | Age: 38
End: 2023-03-21
Attending: PHYSICIAN ASSISTANT
Payer: COMMERCIAL

## 2023-03-21 DIAGNOSIS — M53.3 CHRONIC LEFT SI JOINT PAIN: ICD-10-CM

## 2023-03-21 DIAGNOSIS — L40.0 PLAQUE PSORIASIS: ICD-10-CM

## 2023-03-21 DIAGNOSIS — M25.552 LEFT HIP PAIN: ICD-10-CM

## 2023-03-21 DIAGNOSIS — G89.29 CHRONIC LEFT SI JOINT PAIN: ICD-10-CM

## 2023-03-21 LAB
ALBUMIN SERPL BCP-MCNC: 4.2 G/DL (ref 3.5–5.2)
ALP SERPL-CCNC: 75 U/L (ref 55–135)
ALT SERPL W/O P-5'-P-CCNC: 40 U/L (ref 10–44)
ANION GAP SERPL CALC-SCNC: 13 MMOL/L (ref 8–16)
AST SERPL-CCNC: 27 U/L (ref 10–40)
BASOPHILS # BLD AUTO: 0.04 K/UL (ref 0–0.2)
BASOPHILS NFR BLD: 0.5 % (ref 0–1.9)
BILIRUB SERPL-MCNC: 0.3 MG/DL (ref 0.1–1)
BUN SERPL-MCNC: 8 MG/DL (ref 6–20)
CALCIUM SERPL-MCNC: 9.6 MG/DL (ref 8.7–10.5)
CHLORIDE SERPL-SCNC: 99 MMOL/L (ref 95–110)
CO2 SERPL-SCNC: 26 MMOL/L (ref 23–29)
CREAT SERPL-MCNC: 0.9 MG/DL (ref 0.5–1.4)
CRP SERPL-MCNC: 4.5 MG/L (ref 0–8.2)
DIFFERENTIAL METHOD: NORMAL
EOSINOPHIL # BLD AUTO: 0.1 K/UL (ref 0–0.5)
EOSINOPHIL NFR BLD: 1.4 % (ref 0–8)
ERYTHROCYTE [DISTWIDTH] IN BLOOD BY AUTOMATED COUNT: 12.1 % (ref 11.5–14.5)
ERYTHROCYTE [SEDIMENTATION RATE] IN BLOOD BY PHOTOMETRIC METHOD: 5 MM/HR (ref 0–23)
EST. GFR  (NO RACE VARIABLE): >60 ML/MIN/1.73 M^2
GLUCOSE SERPL-MCNC: 240 MG/DL (ref 70–110)
HCT VFR BLD AUTO: 43.6 % (ref 40–54)
HGB BLD-MCNC: 15.1 G/DL (ref 14–18)
IMM GRANULOCYTES # BLD AUTO: 0.03 K/UL (ref 0–0.04)
IMM GRANULOCYTES NFR BLD AUTO: 0.4 % (ref 0–0.5)
LYMPHOCYTES # BLD AUTO: 2.6 K/UL (ref 1–4.8)
LYMPHOCYTES NFR BLD: 31.6 % (ref 18–48)
MCH RBC QN AUTO: 29.4 PG (ref 27–31)
MCHC RBC AUTO-ENTMCNC: 34.6 G/DL (ref 32–36)
MCV RBC AUTO: 85 FL (ref 82–98)
MONOCYTES # BLD AUTO: 0.5 K/UL (ref 0.3–1)
MONOCYTES NFR BLD: 5.7 % (ref 4–15)
NEUTROPHILS # BLD AUTO: 5 K/UL (ref 1.8–7.7)
NEUTROPHILS NFR BLD: 60.4 % (ref 38–73)
NRBC BLD-RTO: 0 /100 WBC
PLATELET # BLD AUTO: 250 K/UL (ref 150–450)
PMV BLD AUTO: 10.2 FL (ref 9.2–12.9)
POTASSIUM SERPL-SCNC: 4.4 MMOL/L (ref 3.5–5.1)
PROT SERPL-MCNC: 7.4 G/DL (ref 6–8.4)
RBC # BLD AUTO: 5.14 M/UL (ref 4.6–6.2)
SODIUM SERPL-SCNC: 138 MMOL/L (ref 136–145)
WBC # BLD AUTO: 8.29 K/UL (ref 3.9–12.7)

## 2023-03-21 PROCEDURE — 85652 RBC SED RATE AUTOMATED: CPT | Performed by: PHYSICIAN ASSISTANT

## 2023-03-21 PROCEDURE — 80053 COMPREHEN METABOLIC PANEL: CPT | Performed by: PHYSICIAN ASSISTANT

## 2023-03-21 PROCEDURE — 86140 C-REACTIVE PROTEIN: CPT | Performed by: PHYSICIAN ASSISTANT

## 2023-03-21 PROCEDURE — 36415 COLL VENOUS BLD VENIPUNCTURE: CPT | Performed by: PHYSICIAN ASSISTANT

## 2023-03-21 PROCEDURE — 85025 COMPLETE CBC W/AUTO DIFF WBC: CPT | Performed by: PHYSICIAN ASSISTANT

## 2023-03-21 NOTE — TELEPHONE ENCOUNTER
Spoke to patient.  He will stop the Methotrexate today.  He will go downstairs and have labs drawing today. I scheduled virtual visit on 03/29/2023.      He had no question or concerns.

## 2023-03-29 ENCOUNTER — OFFICE VISIT (OUTPATIENT)
Dept: RHEUMATOLOGY | Facility: CLINIC | Age: 38
End: 2023-03-29
Payer: COMMERCIAL

## 2023-03-29 DIAGNOSIS — L40.50 PSORIATIC ARTHRITIS: Primary | ICD-10-CM

## 2023-03-29 DIAGNOSIS — D84.9 IMMUNOCOMPROMISED: ICD-10-CM

## 2023-03-29 DIAGNOSIS — L40.0 PLAQUE PSORIASIS: ICD-10-CM

## 2023-03-29 DIAGNOSIS — Z51.81 MEDICATION MONITORING ENCOUNTER: ICD-10-CM

## 2023-03-29 DIAGNOSIS — Z79.899 HIGH RISK MEDICATION USE: ICD-10-CM

## 2023-03-29 PROCEDURE — 3046F PR MOST RECENT HEMOGLOBIN A1C LEVEL > 9.0%: ICD-10-PCS | Mod: CPTII,95,, | Performed by: PHYSICIAN ASSISTANT

## 2023-03-29 PROCEDURE — 99215 PR OFFICE/OUTPT VISIT, EST, LEVL V, 40-54 MIN: ICD-10-PCS | Mod: 95,,, | Performed by: PHYSICIAN ASSISTANT

## 2023-03-29 PROCEDURE — 3046F HEMOGLOBIN A1C LEVEL >9.0%: CPT | Mod: CPTII,95,, | Performed by: PHYSICIAN ASSISTANT

## 2023-03-29 PROCEDURE — 1160F RVW MEDS BY RX/DR IN RCRD: CPT | Mod: CPTII,95,, | Performed by: PHYSICIAN ASSISTANT

## 2023-03-29 PROCEDURE — 99215 OFFICE O/P EST HI 40 MIN: CPT | Mod: 95,,, | Performed by: PHYSICIAN ASSISTANT

## 2023-03-29 PROCEDURE — 1159F PR MEDICATION LIST DOCUMENTED IN MEDICAL RECORD: ICD-10-PCS | Mod: CPTII,95,, | Performed by: PHYSICIAN ASSISTANT

## 2023-03-29 PROCEDURE — 1160F PR REVIEW ALL MEDS BY PRESCRIBER/CLIN PHARMACIST DOCUMENTED: ICD-10-PCS | Mod: CPTII,95,, | Performed by: PHYSICIAN ASSISTANT

## 2023-03-29 PROCEDURE — 1159F MED LIST DOCD IN RCRD: CPT | Mod: CPTII,95,, | Performed by: PHYSICIAN ASSISTANT

## 2023-03-29 RX ORDER — SECUKINUMAB 150 MG/ML
300 INJECTION SUBCUTANEOUS
Qty: 2 ML | Refills: 2 | Status: SHIPPED | OUTPATIENT
Start: 2023-03-29 | End: 2023-08-08 | Stop reason: SDUPTHER

## 2023-03-29 RX ORDER — SECUKINUMAB 150 MG/ML
300 INJECTION SUBCUTANEOUS
Qty: 10 ML | Refills: 0 | Status: SHIPPED | OUTPATIENT
Start: 2023-03-29 | End: 2023-08-08 | Stop reason: SDUPTHER

## 2023-03-29 RX ORDER — ONDANSETRON 4 MG/1
4 TABLET, FILM COATED ORAL EVERY 8 HOURS PRN
Qty: 30 TABLET | Refills: 0 | Status: SHIPPED | OUTPATIENT
Start: 2023-03-29

## 2023-03-29 RX ORDER — METHOTREXATE 2.5 MG/1
TABLET ORAL
Qty: 24 TABLET | Refills: 0 | Status: SHIPPED | OUTPATIENT
Start: 2023-03-29

## 2023-03-29 NOTE — Clinical Note
Pls contact pt and schedule TB and hep panel this week.  Need to get those before we start cosentyx CBC, CMP in 5 wks w f/u one week later - VV ok

## 2023-03-29 NOTE — PROGRESS NOTES
The patient location is: work  The chief complaint leading to consultation is: PsO/PsA    Visit type: audiovisual    Face to Face time with patient: 20  40 minutes of total time spent on the encounter, which includes face to face time and non-face to face time preparing to see the patient (eg, review of tests), Obtaining and/or reviewing separately obtained history, Documenting clinical information in the electronic or other health record, Independently interpreting results (not separately reported) and communicating results to the patient/family/caregiver, or Care coordination (not separately reported).         Each patient to whom he or she provides medical services by telemedicine is:  (1) informed of the relationship between the physician and patient and the respective role of any other health care provider with respect to management of the patient; and (2) notified that he or she may decline to receive medical services by telemedicine and may withdraw from such care at any time.    Notes:     Subjective:      Patient ID: Ty Ruelas is a 37 y.o. male.    Chief Complaint: No chief complaint on file.      HPI   Ty Ruelas  is a 37 y.o. male seen virtually for f/u PsO and newly diagnosed PsA.  He follows with an external dermatologist.  It is biopsy proven psoriasis.  Currently on a steroid cream which he says is not helpful.  I started methotrexate a few weeks ago.  He has been titrating.  He did okay on the 10 mg dose.  However once he went to the 15 mg dose he started developing significant GI symptoms.  Reports improved hip sxs on MTX.  Seen today to discuss other treatment options.    Since July he started developing left-sided hip pain.  He points laterally as well as into the groin is to wear pain is located.  To a lesser extent has pain posteriorly in left SI joint.  About a year and some change ago he had also been diagnosed with Achilles tendinitis on the right side.  No nail dystrophy.  No  personal history of dactylitis.      MRI of the left hip showed synovitis and tenosynovitis of the gluteal hamstring tendon.  Previously had elevations of CRP which improved upon completion of a steroid.    Patient denies fevers, chills, photosensitivity, eye pain, shortness of breath, chest pain, hematuria, blood in the stool, sicca symptoms, raynauds, finger ulcerations.  Rheumatologic systems otherwise negative.    Serologies/Labs:  none  Current Treatment:  Steroid cream  MTX  Robaxin  Toradol  Previous Treatment:   na      Current Outpatient Medications:     ACCU-CHEK GUIDE GLUCOSE METER Mis, USE TO CHECK BLOOD SUGAR, Disp: , Rfl:     atomoxetine (STRATTERA) 25 MG capsule, Take 25 mg by mouth., Disp: , Rfl:     DAYVIGO 10 mg Tab, Take 1 tablet by mouth every evening., Disp: , Rfl:     folic acid (FOLVITE) 1 MG tablet, Take 1 tablet (1 mg total) by mouth once daily., Disp: 30 tablet, Rfl: 3    meloxicam (MOBIC) 15 MG tablet, Take 1 tablet (15 mg total) by mouth once daily. (Patient not taking: Reported on 2/27/2023), Disp: 30 tablet, Rfl: 2    methotrexate 2.5 MG Tab, 3 PO q 7 days in the am and 3 PO 12 q 7 days in the pm 12 hours later, Disp: 24 tablet, Rfl: 0    ondansetron (ZOFRAN) 4 MG tablet, Take 1 tablet (4 mg total) by mouth every 8 (eight) hours as needed for Nausea., Disp: 30 tablet, Rfl: 0    predniSONE (DELTASONE) 50 MG Tab, Take 1 tablet (50 mg total) by mouth once daily. (Patient not taking: Reported on 2/27/2023), Disp: 5 tablet, Rfl: 0    TRINTELLIX 20 mg Tab, Take 1 tablet by mouth., Disp: , Rfl:     Past Medical History:   Diagnosis Date    Diabetes mellitus, type 2     Psoriasis      History reviewed. No pertinent family history.  Social History     Socioeconomic History    Marital status:    Tobacco Use    Smoking status: Never    Smokeless tobacco: Never   Substance and Sexual Activity    Alcohol use: Never    Drug use: Never    Sexual activity: Yes     Review of patient's allergies  indicates:  No Known Allergies    Objective:   There were no vitals taken for this visit.    There is no immunization history on file for this patient.    Physical Exam   Constitutional: He is oriented to person, place, and time. No distress.   HENT:   Head: Normocephalic and atraumatic.   Pulmonary/Chest: Effort normal.   Neurological: He is alert and oriented to person, place, and time.   Skin: Rash (psoriasis bilateral forearms) noted.   Psychiatric: His behavior is normal. Mood normal.   Nursing note and vitals reviewed.            Recent Results (from the past 672 hour(s))   CBC Auto Differential    Collection Time: 03/21/23  5:06 PM   Result Value Ref Range    WBC 8.29 3.90 - 12.70 K/uL    RBC 5.14 4.60 - 6.20 M/uL    Hemoglobin 15.1 14.0 - 18.0 g/dL    Hematocrit 43.6 40.0 - 54.0 %    MCV 85 82 - 98 fL    MCH 29.4 27.0 - 31.0 pg    MCHC 34.6 32.0 - 36.0 g/dL    RDW 12.1 11.5 - 14.5 %    Platelets 250 150 - 450 K/uL    MPV 10.2 9.2 - 12.9 fL    Immature Granulocytes 0.4 0.0 - 0.5 %    Gran # (ANC) 5.0 1.8 - 7.7 K/uL    Immature Grans (Abs) 0.03 0.00 - 0.04 K/uL    Lymph # 2.6 1.0 - 4.8 K/uL    Mono # 0.5 0.3 - 1.0 K/uL    Eos # 0.1 0.0 - 0.5 K/uL    Baso # 0.04 0.00 - 0.20 K/uL    nRBC 0 0 /100 WBC    Gran % 60.4 38.0 - 73.0 %    Lymph % 31.6 18.0 - 48.0 %    Mono % 5.7 4.0 - 15.0 %    Eosinophil % 1.4 0.0 - 8.0 %    Basophil % 0.5 0.0 - 1.9 %    Differential Method Automated    Comprehensive Metabolic Panel    Collection Time: 03/21/23  5:06 PM   Result Value Ref Range    Sodium 138 136 - 145 mmol/L    Potassium 4.4 3.5 - 5.1 mmol/L    Chloride 99 95 - 110 mmol/L    CO2 26 23 - 29 mmol/L    Glucose 240 (H) 70 - 110 mg/dL    BUN 8 6 - 20 mg/dL    Creatinine 0.9 0.5 - 1.4 mg/dL    Calcium 9.6 8.7 - 10.5 mg/dL    Total Protein 7.4 6.0 - 8.4 g/dL    Albumin 4.2 3.5 - 5.2 g/dL    Total Bilirubin 0.3 0.1 - 1.0 mg/dL    Alkaline Phosphatase 75 55 - 135 U/L    AST 27 10 - 40 U/L    ALT 40 10 - 44 U/L    Anion Gap 13  8 - 16 mmol/L    eGFR >60 >60 mL/min/1.73 m^2   Sedimentation rate    Collection Time: 03/21/23  5:06 PM   Result Value Ref Range    Sed Rate 5 0 - 23 mm/Hr   C-Reactive Protein    Collection Time: 03/21/23  5:06 PM   Result Value Ref Range    CRP 4.5 0.0 - 8.2 mg/L       No results found for: TBGOLDPLUS   No results found for: HAV, HEPAIGM, HEPBIGM, HEPBCAB, HBEAG, HEPCAB       Assessment:     1. Psoriatic arthritis    2. Plaque psoriasis    3. High risk medication use    4. Immunocompromised    5. Medication monitoring encounter              Plan:     Diagnoses and all orders for this visit:    Psoriatic arthritis  -     ondansetron (ZOFRAN) 4 MG tablet; Take 1 tablet (4 mg total) by mouth every 8 (eight) hours as needed for Nausea.  -     methotrexate 2.5 MG Tab; 3 PO q 7 days in the am and 3 PO 12 q 7 days in the pm 12 hours later    Plaque psoriasis  -     ondansetron (ZOFRAN) 4 MG tablet; Take 1 tablet (4 mg total) by mouth every 8 (eight) hours as needed for Nausea.  -     methotrexate 2.5 MG Tab; 3 PO q 7 days in the am and 3 PO 12 q 7 days in the pm 12 hours later    High risk medication use  -     Quantiferon Gold TB; Standing  -     Hepatitis Panel, Acute; Standing    Immunocompromised  -     Quantiferon Gold TB; Standing  -     Hepatitis Panel, Acute; Standing    Medication monitoring encounter      Plaque Psoriasis now w PsA - uncontrolled  [place] Criteria  Active plaque psoriasis  Neg RF  Elevated CRP previously - improved on steroids  Am stiffness > 1 hr  MRI showing tenosynovitis left glute  MTX 15 mg weekly split dose until cosentyx approved  Zofran 30 min prior to MTX dose  DC when cosentyx started  Cosentyx 300 mg SQ q weeks x 5 weeks then q 4 thereafter  Discussed risks and benefits of medication  Literature provided on the summary for today's visit via the patient portal  DM - previously uncontrolled - improving.  Follow-up with PCP  Drug therapy requiring intensive monitoring for toxicity  High  Risk Medication Monitoring encounter  No current medication related issues, no evidence of toxicity  I ordered labs for toxicity monitoring, have personally reviewed the findings, and discussed them with the patient.  Pending labs will be sent via the portal  Compromised immune system secondary to autoimmune disease and/or use of immunosuppressive drugs.  Monitor carefully for infections.  Advised patient to get immediate medical care if any infection arises.  Also advised strict adherence age-appropriate vaccinations and cancer screenings with PCP.  Patient advised to hold DMARD and/or biologic therapy for signs of infection or for surgery. If you are unsure what to do please call our office for instruction.[facility] [phone]  Return to clinic: 5-6 weeks, reg 4 in 1 mos    Follow up in about 6 weeks (around 5/10/2023).    The patient understands, chooses and consents to this plan and accepts all the risks which include but are not limited to the risks mentioned above.     Disclaimer: This note was prepared using a voice recognition system and is likely to have sound alike errors within the text.

## 2023-03-30 ENCOUNTER — SPECIALTY PHARMACY (OUTPATIENT)
Dept: PHARMACY | Facility: CLINIC | Age: 38
End: 2023-03-30
Payer: COMMERCIAL

## 2023-03-30 DIAGNOSIS — L40.50 PSORIATIC ARTHRITIS: Primary | ICD-10-CM

## 2023-03-30 NOTE — TELEPHONE ENCOUNTER
Cosentyx RX received.    PA is required.  PA submission pending TB/Hepatitis panel collection/results. Could get done as early as tomorrow morning.     Earl, this is Isabella Hodge with [facility].  We are working on your prescription that your doctor has sent us. We will be working with your insurance to get this approved for you. We will be calling you along the way with updates on your medication.  If you have any questions, you can reach us at [phone].  Welcome call outcome: Patient/caregiver reached.

## 2023-03-31 ENCOUNTER — LAB VISIT (OUTPATIENT)
Dept: LAB | Facility: HOSPITAL | Age: 38
End: 2023-03-31
Attending: PHYSICIAN ASSISTANT
Payer: COMMERCIAL

## 2023-03-31 DIAGNOSIS — D84.9 IMMUNOCOMPROMISED: ICD-10-CM

## 2023-03-31 DIAGNOSIS — Z79.899 HIGH RISK MEDICATION USE: ICD-10-CM

## 2023-03-31 LAB
HAV IGM SERPL QL IA: NORMAL
HBV CORE IGM SERPL QL IA: NORMAL
HBV SURFACE AG SERPL QL IA: NORMAL
HCV AB SERPL QL IA: NORMAL

## 2023-03-31 PROCEDURE — 80074 ACUTE HEPATITIS PANEL: CPT | Performed by: PHYSICIAN ASSISTANT

## 2023-03-31 PROCEDURE — 86480 TB TEST CELL IMMUN MEASURE: CPT | Performed by: PHYSICIAN ASSISTANT

## 2023-03-31 PROCEDURE — 36415 COLL VENOUS BLD VENIPUNCTURE: CPT | Performed by: PHYSICIAN ASSISTANT

## 2023-04-03 LAB
GAMMA INTERFERON BACKGROUND BLD IA-ACNC: 0.03 IU/ML
M TB IFN-G CD4+ BCKGRND COR BLD-ACNC: 0.01 IU/ML
MITOGEN IGNF BCKGRD COR BLD-ACNC: 9.97 IU/ML
TB GOLD PLUS: NEGATIVE
TB2 - NIL: 0.01 IU/ML

## 2023-04-04 NOTE — TELEPHONE ENCOUNTER
PA approved from 04/04/2023 to 5/2/23 for loading dose.   Awaiting fax confirmation for PA approval of maintenance dose, effective dates, case ID.

## 2023-04-05 ENCOUNTER — SPECIALTY PHARMACY (OUTPATIENT)
Dept: PHARMACY | Facility: CLINIC | Age: 38
End: 2023-04-05
Payer: COMMERCIAL

## 2023-04-05 DIAGNOSIS — L40.9 PSORIASIS: ICD-10-CM

## 2023-04-05 DIAGNOSIS — L40.50 PSORIATIC ARTHRITIS: Primary | ICD-10-CM

## 2023-04-05 NOTE — TELEPHONE ENCOUNTER
Specialty Pharmacy - Initial Clinical Assessment    Specialty Medication Orders Linked to Encounter      Flowsheet Row Most Recent Value   Medication #1 secukinumab (COSENTYX PEN, 2 PENS,) 150 mg/mL PnIj (Order#[id number], Rx#[id number])   Medication #2 secukinumab (COSENTYX PEN, 2 PENS,) 150 mg/mL PnIj (Order#[id number], Rx#[id number])          Patient Diagnosis   Psoriatic arthritis (L40.50), Psoriasis (L40.9)     Subjective  Ty Ruelas is a 37 y.o. male, who is followed by the specialty pharmacy service for management and education.    Recent Encounters       Date Type Provider Description    04/05/2023 Specialty Pharmacy Isabella Hodge, Sandy Initial Clinical Assessment    03/30/2023 Specialty Pharmacy Isabella Hodge, Sandy Referral Authorization          Clinical call attempts since last clinical assessment   No call attempts found.     Current Outpatient Medications   Medication Sig Note    semaglutide (RYBELSUS) 7 mg tablet Take 7 mg by mouth once daily.     ACCU-CHEK GUIDE GLUCOSE METER Misc USE TO CHECK BLOOD SUGAR     atomoxetine (STRATTERA) 25 MG capsule Take 25 mg by mouth.     DAYVIGO 10 mg Tab Take 1 tablet by mouth every evening.     folic acid (FOLVITE) 1 MG tablet Take 1 tablet (1 mg total) by mouth once daily. 4/5/2023: Patient no longer taking.    methotrexate 2.5 MG Tab 3 PO q 7 days in the am and 3 PO 12 q 7 days in the pm 12 hours later 4/5/2023: Patient no longer taking.      ondansetron (ZOFRAN) 4 MG tablet Take 1 tablet (4 mg total) by mouth every 8 (eight) hours as needed for Nausea. 4/5/2023: Patient no longer taking.      secukinumab (COSENTYX PEN, 2 PENS,) 150 mg/mL PnIj Inject 300 mg (2 mL) into the skin every 28 days.     secukinumab (COSENTYX PEN, 2 PENS,) 150 mg/mL PnIj Inject 300 mg (2 mL) into the skin every 7 days for 5 doses, then inject 300 mg (2 mL) into the skin every 4 weeks thereafter.     TRINTELLIX 20 mg Tab Take 1 tablet by mouth.    Last reviewed on 4/5/2023   9:53 AM by Isabella Hodge, PharmD    Review of patient's allergies indicates:  No Known AllergiesLast reviewed on  4/5/2023 9:51 AM by Isabella Hodge    Drug Interactions    Drug interactions evaluated: yes  Clinically relevant drug interactions identified: no  Provided the patient with educational material regarding drug interactions: not applicable         Adverse Effects    Rash: Pos  Arthralgias: Pos  Back pain: Pos  Gait problem: Pos  Joint swelling: Pos       Assessment Questions - Documented Responses      Flowsheet Row Most Recent Value   Assessment    Medication Reconciliation completed for patient Yes   During the past 4 weeks, has patient missed any activities due to condition or medication? Missed Work   During the past 4 weeks, did patient have any of the following urgent care visits? None   Goals of Therapy Status Discussed (new start)   Status of the patients ability to self-administer: Is Able   All education points have been covered with patient? Yes, supplemental printed education provided   Welcome packet contents reviewed and discussed with patient? Yes   Assesment completed? Yes   Plan Therapy being initiated   Do you need to open a clinical intervention (i-vent)? No   Do you want to schedule first shipment? Yes          Refill Questions - Documented Responses      Flowsheet Row Most Recent Value   Refill Screening Questions    When does the patient need to receive the medication? 04/06/23   Refill Delivery Questions    How will the patient receive the medication? MEDRx   When does the patient need to receive the medication? 04/06/23   Shipping Address Home   Address in [facility] confirmed and updated if neccessary? Yes   Expected Copay ($) 0   Is the patient able to afford the medication copay? Yes   Payment Method zero copay   Days supply of Refill 28   Supplies needed? No supplies needed, Alcohol Swabs   Refill activity completed? Yes   Refill activity plan Refill scheduled    Shipment/Pickup Date: 04/05/23            Objective    He has a past medical history of Diabetes mellitus, type 2 and Psoriasis.    Tried/failed medications: MTX, NSAIDs, steroids     BP Readings from Last 4 Encounters:   02/27/23 110/74   12/16/22 135/81   12/12/22 116/83     Ht Readings from Last 4 Encounters:   02/27/23 6' (1.829 m)   12/16/22 6' (1.829 m)   12/12/22 6' (1.829 m)     Wt Readings from Last 4 Encounters:   02/27/23 103.4 kg (227 lb 15.3 oz)   12/16/22 102.2 kg (225 lb 5 oz)   12/12/22 100.7 kg (222 lb)     Recent Labs   Lab Result Units 03/31/23  0719 03/21/23  1706 02/27/23  0941 01/31/23  1357   Creatinine mg/dL  --  0.9 1.0 0.9   ALT U/L  --  40 28 30   AST U/L  --  27 17 17   Hepatitis B Surface Ag  Non-reactive  --   --   --      The goals of prescribed drug therapy management include:  Supporting patient to meet the prescriber's medical treatment objectives  Improving or maintaining quality of life  Maintaining optimal therapy adherence  Minimizing and managing side effects      Goals of Therapy Status: Discussed (new start)    Assessment/Plan  Patient plans to start therapy on 04/06/23      Indication, dosage, appropriateness, effectiveness, safety and convenience of his specialty medication(s) were reviewed today.     Patient Education   Patient received education on the following:   Expectations and possible outcomes of therapy  Proper use, timely administration, and missed dose management  Duration of therapy  Side effects, including prevention, minimization, and management  Contraindications and safety precautions  New or changed medications, including prescribe and over the counter medications and supplements  Reviews recommended vaccinations, as appropriate  Storage, safe handling, and disposal  Patient educated on all topics. Patient expressed understanding and had no further questions.     Tasks added this encounter   No tasks added.   Tasks due within next 3 months   4/5/2023 -  Clinical - Initial Assessment     Isabella Hodge, PharmD  [facility]  [address]  [facility] [address]  Phone: [phone]  Fax: [phone]

## 2023-04-05 NOTE — TELEPHONE ENCOUNTER
Outgoing call to plan to confirm PA coverage dates for maintenance dose.   Rep: Chapis  Maintenance dose PA approved from 5/3/23 to 4/3/2024     Benefits Investigation   Insurance name: [employer]   Rep name: Chapis   Copay amount: $80  Deductible: $remaining $1086.99   OOPmax: $ remaining $2535.31   OSP in network? Y      Financial Assistance   Copay card obtained and entered into [employer].   Forwarding to Initial.

## 2023-04-28 ENCOUNTER — SPECIALTY PHARMACY (OUTPATIENT)
Dept: PHARMACY | Facility: CLINIC | Age: 38
End: 2023-04-28
Payer: COMMERCIAL

## 2023-04-28 NOTE — TELEPHONE ENCOUNTER
Specialty Pharmacy - Refill Coordination    Specialty Medication Orders Linked to Encounter      Flowsheet Row Most Recent Value   Medication #1 secukinumab (COSENTYX PEN, 2 PENS,) 150 mg/mL PnIj (Order#[id number], Rx#[id number])   Medication #2 secukinumab (COSENTYX PEN, 2 PENS,) 150 mg/mL PnIj (Order#[id number], Rx#[id number])            Refill Questions - Documented Responses      Flowsheet Row Most Recent Value   Patient Availability and HIPAA Verification    Does patient want to proceed with activity? Yes   HIPAA/medical authority confirmed? Yes   Relationship to patient of person spoken to? Self   Refill Screening Questions    Changes to allergies? No   Changes to medications? No   New conditions since last clinic visit? No   Unplanned office visit, urgent care, ED, or hospital admission in the last 4 weeks? No   How does patient/caregiver feel medication is working? Good   Financial problems or insurance changes? No   How many doses of your specialty medications were missed in the last 4 weeks? 0   Would patient like to speak to a pharmacist? No   When does the patient need to receive the medication? 05/04/23   Refill Delivery Questions    How will the patient receive the medication? MEDRx   When does the patient need to receive the medication? 05/04/23   Shipping Address Home   Address in [facility] confirmed and updated if neccessary? Yes   Expected Copay ($) 0   Is the patient able to afford the medication copay? Yes   Payment Method zero copay   Days supply of Refill 28   Supplies needed? No supplies needed   Refill activity completed? Yes   Refill activity plan Refill scheduled   Shipment/Pickup Date: 05/01/23            Current Outpatient Medications   Medication Sig    ACCU-CHEK GUIDE GLUCOSE METER Misc USE TO CHECK BLOOD SUGAR    atomoxetine (STRATTERA) 25 MG capsule Take 25 mg by mouth.    DAYVIGO 10 mg Tab Take 1 tablet by mouth every evening.    folic acid (FOLVITE) 1 MG tablet Take 1 tablet  (1 mg total) by mouth once daily.    methotrexate 2.5 MG Tab 3 PO q 7 days in the am and 3 PO 12 q 7 days in the pm 12 hours later    ondansetron (ZOFRAN) 4 MG tablet Take 1 tablet (4 mg total) by mouth every 8 (eight) hours as needed for Nausea.    secukinumab (COSENTYX PEN, 2 PENS,) 150 mg/mL PnIj Inject 300 mg (2 mL) into the skin every 28 days.    secukinumab (COSENTYX PEN, 2 PENS,) 150 mg/mL PnIj Inject 300 mg (2 mL) into the skin every 7 days for 5 doses, then inject 300 mg (2 mL) into the skin every 4 weeks thereafter.    semaglutide (RYBELSUS) 7 mg tablet Take 7 mg by mouth once daily.    TRINTELLIX 20 mg Tab Take 1 tablet by mouth.   Last reviewed on 4/5/2023  9:53 AM by Isabella Hodge, Sandy    Review of patient's allergies indicates:  No Known Allergies Last reviewed on  4/5/2023 9:51 AM by Isabella Hodge      Tasks added this encounter   No tasks added.   Tasks due within next 3 months   4/27/2023 - Refill Coordination Outreach (1 time occurrence)     Isabella Hodge, PharmD  [facility]  [address] [place] [id number]  Phone: [phone]  Fax: [phone]

## 2023-05-10 ENCOUNTER — OFFICE VISIT (OUTPATIENT)
Dept: RHEUMATOLOGY | Facility: CLINIC | Age: 38
End: 2023-05-10
Payer: COMMERCIAL

## 2023-05-10 ENCOUNTER — LAB VISIT (OUTPATIENT)
Dept: LAB | Facility: HOSPITAL | Age: 38
End: 2023-05-10
Attending: PHYSICIAN ASSISTANT
Payer: COMMERCIAL

## 2023-05-10 ENCOUNTER — PATIENT MESSAGE (OUTPATIENT)
Dept: RHEUMATOLOGY | Facility: CLINIC | Age: 38
End: 2023-05-10

## 2023-05-10 DIAGNOSIS — M25.552 LEFT HIP PAIN: ICD-10-CM

## 2023-05-10 DIAGNOSIS — Z79.899 HIGH RISK MEDICATION USE: ICD-10-CM

## 2023-05-10 DIAGNOSIS — L40.0 PLAQUE PSORIASIS: ICD-10-CM

## 2023-05-10 DIAGNOSIS — L40.50 PSORIATIC ARTHRITIS: Primary | ICD-10-CM

## 2023-05-10 DIAGNOSIS — Z51.81 MEDICATION MONITORING ENCOUNTER: ICD-10-CM

## 2023-05-10 DIAGNOSIS — M53.3 CHRONIC LEFT SI JOINT PAIN: ICD-10-CM

## 2023-05-10 DIAGNOSIS — G89.29 CHRONIC LEFT SI JOINT PAIN: ICD-10-CM

## 2023-05-10 DIAGNOSIS — E11.69 TYPE 2 DIABETES MELLITUS WITH OTHER SPECIFIED COMPLICATION, WITHOUT LONG-TERM CURRENT USE OF INSULIN: ICD-10-CM

## 2023-05-10 DIAGNOSIS — D84.9 IMMUNOCOMPROMISED: ICD-10-CM

## 2023-05-10 LAB
ALBUMIN SERPL BCP-MCNC: 4.3 G/DL (ref 3.5–5.2)
ALP SERPL-CCNC: 67 U/L (ref 55–135)
ALT SERPL W/O P-5'-P-CCNC: 39 U/L (ref 10–44)
ANION GAP SERPL CALC-SCNC: 11 MMOL/L (ref 8–16)
AST SERPL-CCNC: 31 U/L (ref 10–40)
BASOPHILS # BLD AUTO: 0.03 K/UL (ref 0–0.2)
BASOPHILS NFR BLD: 0.4 % (ref 0–1.9)
BILIRUB SERPL-MCNC: 0.5 MG/DL (ref 0.1–1)
BUN SERPL-MCNC: 9 MG/DL (ref 6–20)
CALCIUM SERPL-MCNC: 9.5 MG/DL (ref 8.7–10.5)
CHLORIDE SERPL-SCNC: 98 MMOL/L (ref 95–110)
CO2 SERPL-SCNC: 27 MMOL/L (ref 23–29)
CREAT SERPL-MCNC: 1 MG/DL (ref 0.5–1.4)
DIFFERENTIAL METHOD: NORMAL
EOSINOPHIL # BLD AUTO: 0.1 K/UL (ref 0–0.5)
EOSINOPHIL NFR BLD: 1.5 % (ref 0–8)
ERYTHROCYTE [DISTWIDTH] IN BLOOD BY AUTOMATED COUNT: 11.9 % (ref 11.5–14.5)
EST. GFR  (NO RACE VARIABLE): >60 ML/MIN/1.73 M^2
GLUCOSE SERPL-MCNC: 301 MG/DL (ref 70–110)
HCT VFR BLD AUTO: 43.2 % (ref 40–54)
HGB BLD-MCNC: 15.1 G/DL (ref 14–18)
IMM GRANULOCYTES # BLD AUTO: 0.01 K/UL (ref 0–0.04)
IMM GRANULOCYTES NFR BLD AUTO: 0.1 % (ref 0–0.5)
LYMPHOCYTES # BLD AUTO: 2.4 K/UL (ref 1–4.8)
LYMPHOCYTES NFR BLD: 33.2 % (ref 18–48)
MCH RBC QN AUTO: 29.5 PG (ref 27–31)
MCHC RBC AUTO-ENTMCNC: 35 G/DL (ref 32–36)
MCV RBC AUTO: 84 FL (ref 82–98)
MONOCYTES # BLD AUTO: 0.5 K/UL (ref 0.3–1)
MONOCYTES NFR BLD: 7.5 % (ref 4–15)
NEUTROPHILS # BLD AUTO: 4.1 K/UL (ref 1.8–7.7)
NEUTROPHILS NFR BLD: 57.3 % (ref 38–73)
NRBC BLD-RTO: 0 /100 WBC
PLATELET # BLD AUTO: 209 K/UL (ref 150–450)
PMV BLD AUTO: 10.2 FL (ref 9.2–12.9)
POTASSIUM SERPL-SCNC: 4.1 MMOL/L (ref 3.5–5.1)
PROT SERPL-MCNC: 7.4 G/DL (ref 6–8.4)
RBC # BLD AUTO: 5.12 M/UL (ref 4.6–6.2)
SODIUM SERPL-SCNC: 136 MMOL/L (ref 136–145)
WBC # BLD AUTO: 7.22 K/UL (ref 3.9–12.7)

## 2023-05-10 PROCEDURE — 99214 PR OFFICE/OUTPT VISIT, EST, LEVL IV, 30-39 MIN: ICD-10-PCS | Mod: 95,,, | Performed by: PHYSICIAN ASSISTANT

## 2023-05-10 PROCEDURE — 99214 OFFICE O/P EST MOD 30 MIN: CPT | Mod: 95,,, | Performed by: PHYSICIAN ASSISTANT

## 2023-05-10 PROCEDURE — 3046F PR MOST RECENT HEMOGLOBIN A1C LEVEL > 9.0%: ICD-10-PCS | Mod: CPTII,95,, | Performed by: PHYSICIAN ASSISTANT

## 2023-05-10 PROCEDURE — 1159F PR MEDICATION LIST DOCUMENTED IN MEDICAL RECORD: ICD-10-PCS | Mod: CPTII,95,, | Performed by: PHYSICIAN ASSISTANT

## 2023-05-10 PROCEDURE — 85025 COMPLETE CBC W/AUTO DIFF WBC: CPT | Performed by: PHYSICIAN ASSISTANT

## 2023-05-10 PROCEDURE — 3046F HEMOGLOBIN A1C LEVEL >9.0%: CPT | Mod: CPTII,95,, | Performed by: PHYSICIAN ASSISTANT

## 2023-05-10 PROCEDURE — 36415 COLL VENOUS BLD VENIPUNCTURE: CPT | Performed by: PHYSICIAN ASSISTANT

## 2023-05-10 PROCEDURE — 1159F MED LIST DOCD IN RCRD: CPT | Mod: CPTII,95,, | Performed by: PHYSICIAN ASSISTANT

## 2023-05-10 PROCEDURE — 1160F RVW MEDS BY RX/DR IN RCRD: CPT | Mod: CPTII,95,, | Performed by: PHYSICIAN ASSISTANT

## 2023-05-10 PROCEDURE — 80053 COMPREHEN METABOLIC PANEL: CPT | Performed by: PHYSICIAN ASSISTANT

## 2023-05-10 PROCEDURE — 1160F PR REVIEW ALL MEDS BY PRESCRIBER/CLIN PHARMACIST DOCUMENTED: ICD-10-PCS | Mod: CPTII,95,, | Performed by: PHYSICIAN ASSISTANT

## 2023-05-10 NOTE — PROGRESS NOTES
The patient location is: home  The chief complaint leading to consultation is: f/u PsA c PsO    Visit type: audiovisual    Face to Face time with patient: 10  20 minutes of total time spent on the encounter, which includes face to face time and non-face to face time preparing to see the patient (eg, review of tests), Obtaining and/or reviewing separately obtained history, Documenting clinical information in the electronic or other health record, Independently interpreting results (not separately reported) and communicating results to the patient/family/caregiver, or Care coordination (not separately reported).         Each patient to whom he or she provides medical services by telemedicine is:  (1) informed of the relationship between the physician and patient and the respective role of any other health care provider with respect to management of the patient; and (2) notified that he or she may decline to receive medical services by telemedicine and may withdraw from such care at any time.    Subjective:      Patient ID: Ty Ruelas is a 37 y.o. male.    Chief Complaint: No chief complaint on file.      HPI   Ty Ruelas  is a 37 y.o. male seen virtually for f/u PsO and PsA.  He follows with an external dermatologist.  It is biopsy proven psoriasis. Failed MTX d/t gi s/e although  he had improvement in joint and skin symptoms.  Started Cosentyx about 5 weeks ago.  He is tolerating it much better.  Joint symptoms and skin are doing very well.  He is happy with his progress.  Left hip is doing much better.  Is complaining of some lumbosacral pain today but overall not bad.    Patient denies fevers, chills, photosensitivity, eye pain, shortness of breath, chest pain, hematuria, blood in the stool, sicca symptoms, raynauds, finger ulcerations.  Rheumatologic systems otherwise negative.    Serologies/Labs:  none  Current Treatment:  Steroid cream  Cosentyx  Robaxin  Toradol  Previous Treatment:   MTX - gi  s/e      Current Outpatient Medications:     ACCU-CHEK GUIDE GLUCOSE METER [facility], USE TO CHECK BLOOD SUGAR, Disp: , Rfl:     atomoxetine (STRATTERA) 25 MG capsule, Take 25 mg by mouth., Disp: , Rfl:     DAYVIGO 10 mg Tab, Take 1 tablet by mouth every evening., Disp: , Rfl:     folic acid (FOLVITE) 1 MG tablet, Take 1 tablet (1 mg total) by mouth once daily., Disp: 30 tablet, Rfl: 3    methotrexate 2.5 MG Tab, 3 PO q 7 days in the am and 3 PO 12 q 7 days in the pm 12 hours later, Disp: 24 tablet, Rfl: 0    ondansetron (ZOFRAN) 4 MG tablet, Take 1 tablet (4 mg total) by mouth every 8 (eight) hours as needed for Nausea., Disp: 30 tablet, Rfl: 0    secukinumab (COSENTYX PEN, 2 PENS,) 150 mg/mL PnIj, Inject 300 mg (2 mL) into the skin every 28 days., Disp: 2 mL, Rfl: 2    secukinumab (COSENTYX PEN, 2 PENS,) 150 mg/mL PnIj, Inject 300 mg (2 mL) into the skin every 7 days for 5 doses, then inject 300 mg (2 mL) into the skin every 4 weeks thereafter., Disp: 10 mL, Rfl: 0    semaglutide (RYBELSUS) 7 mg tablet, Take 7 mg by mouth once daily., Disp: , Rfl:     TRINTELLIX 20 mg Tab, Take 1 tablet by mouth., Disp: , Rfl:     Past Medical History:   Diagnosis Date    Diabetes mellitus, type 2     Psoriasis      No family history on file.  Social History     Socioeconomic History    Marital status:    Tobacco Use    Smoking status: Never    Smokeless tobacco: Never   Substance and Sexual Activity    Alcohol use: Never    Drug use: Never    Sexual activity: Yes     Review of patient's allergies indicates:  No Known Allergies    Objective:   There were no vitals taken for this visit.    There is no immunization history on file for this patient.    Physical Exam   Constitutional: He is oriented to person, place, and time. No distress.   HENT:   Head: Normocephalic and atraumatic.   Pulmonary/Chest: Effort normal.   Neurological: He is alert and oriented to person, place, and time.   Skin: Rash: psoriasis bilateral forearms.    Psychiatric: His behavior is normal. Mood normal.   Nursing note and vitals reviewed.    No visibile rash on VV  No apparent dactylitis  Rash improved forearms  Full fist formation    No results found for this or any previous visit (from the past 672 hour(s)).      Lab Results   Component Value Date    TBGOLDPLUS Negative 03/31/2023      Lab Results   Component Value Date    HEPAIGM Non-reactive 03/31/2023    HEPBIGM Non-reactive 03/31/2023    HEPCAB Non-reactive 03/31/2023          Assessment:     1. Psoriatic arthritis    2. Plaque psoriasis    3. High risk medication use    4. Immunocompromised    5. Medication monitoring encounter    6. Type 2 diabetes mellitus with other specified complication, without long-term current use of insulin                Plan:     Diagnoses and all orders for this visit:    Psoriatic arthritis    Plaque psoriasis    High risk medication use    Immunocompromised    Medication monitoring encounter    Type 2 diabetes mellitus with other specified complication, without long-term current use of insulin          Plaque Psoriasis now w PsA - uncontrolled  [place] Criteria  Active plaque psoriasis  Neg RF  Elevated CRP previously - improved on steroids  Am stiffness > 1 hr  MRI showing tenosynovitis left glute  Need CBC/CMP today - I scheduled these for him   C/w Cosentyx 300 mg SQ q weeksq 4 wks pending labs today  Tolerating well  Good skin clearance  Joints improved  DM - previously uncontrolled - improving.  Follow-up with PCP  Drug therapy requiring intensive monitoring for toxicity  High Risk Medication Monitoring encounter  No current medication related issues, no evidence of toxicity  I ordered labs for toxicity monitoring, have personally reviewed the findings, and discussed them with the patient.  Pending labs will be sent via the portal  Compromised immune system secondary to autoimmune disease and/or use of immunosuppressive drugs.  Monitor carefully for infections.  Advised  patient to get immediate medical care if any infection arises.  Also advised strict adherence age-appropriate vaccinations and cancer screenings with PCP.  Patient advised to hold DMARD and/or biologic therapy for signs of infection or for surgery. If you are unsure what to do please call our office for instruction.[facility] [phone]  Return to clinic: 2-3 mos, reg 4 one week prior    Follow up in about 2 months (around 7/10/2023).    The patient understands, chooses and consents to this plan and accepts all the risks which include but are not limited to the risks mentioned above.     Disclaimer: This note was prepared using a voice recognition system and is likely to have sound alike errors within the text.

## 2023-05-12 ENCOUNTER — PATIENT MESSAGE (OUTPATIENT)
Dept: RHEUMATOLOGY | Facility: CLINIC | Age: 38
End: 2023-05-12
Payer: COMMERCIAL

## 2023-05-22 ENCOUNTER — SPECIALTY PHARMACY (OUTPATIENT)
Dept: PHARMACY | Facility: CLINIC | Age: 38
End: 2023-05-22
Payer: COMMERCIAL

## 2023-05-22 NOTE — TELEPHONE ENCOUNTER
Specialty Pharmacy - Refill Coordination    Specialty Medication Orders Linked to Encounter      Flowsheet Row Most Recent Value   Medication #1 secukinumab (COSENTYX PEN, 2 PENS,) 150 mg/mL PnIj (Order#[id number], Rx#[id number])            Refill Questions - Documented Responses      Flowsheet Row Most Recent Value   Patient Availability and HIPAA Verification    Does patient want to proceed with activity? Yes   HIPAA/medical authority confirmed? Yes   Relationship to patient of person spoken to? Self   Refill Screening Questions    Changes to allergies? No   Changes to medications? No   New conditions since last clinic visit? No   Unplanned office visit, urgent care, ED, or hospital admission in the last 4 weeks? No   How does patient/caregiver feel medication is working? Very good   Financial problems or insurance changes? No   How many doses of your specialty medications were missed in the last 4 weeks? 0   Would patient like to speak to a pharmacist? No   When does the patient need to receive the medication? 06/01/23   Refill Delivery Questions    How will the patient receive the medication? MEDRx   When does the patient need to receive the medication? 06/01/23   Shipping Address Home   Address in [facility] confirmed and updated if neccessary? Yes   Expected Copay ($) 0   Is the patient able to afford the medication copay? Yes   Payment Method zero copay   Days supply of Refill 28   Supplies needed? Alcohol Swabs   Refill activity completed? Yes   Refill activity plan Refill scheduled   Shipment/Pickup Date: 05/29/23            Current Outpatient Medications   Medication Sig    ACCU-CHEK GUIDE GLUCOSE METER Misc USE TO CHECK BLOOD SUGAR    atomoxetine (STRATTERA) 25 MG capsule Take 25 mg by mouth.    DAYVIGO 10 mg Tab Take 1 tablet by mouth every evening.    folic acid (FOLVITE) 1 MG tablet Take 1 tablet (1 mg total) by mouth once daily.    methotrexate 2.5 MG Tab 3 PO q 7 days in the am and 3 PO 12 q 7  days in the pm 12 hours later    ondansetron (ZOFRAN) 4 MG tablet Take 1 tablet (4 mg total) by mouth every 8 (eight) hours as needed for Nausea.    secukinumab (COSENTYX PEN, 2 PENS,) 150 mg/mL PnIj Inject 300 mg (2 mL) into the skin every 28 days.    secukinumab (COSENTYX PEN, 2 PENS,) 150 mg/mL PnIj Inject 300 mg (2 mL) into the skin every 7 days for 5 doses, then inject 300 mg (2 mL) into the skin every 4 weeks thereafter.    semaglutide (RYBELSUS) 7 mg tablet Take 7 mg by mouth once daily.    TRINTELLIX 20 mg Tab Take 1 tablet by mouth.   Last reviewed on 5/10/2023  8:31 AM by Lou Cotton, PARicciC    Review of patient's allergies indicates:  No Known Allergies Last reviewed on  5/10/2023 8:31 AM by Lou Cotton      Tasks added this encounter   No tasks added.   Tasks due within next 3 months   No tasks due.     Lenka Estrada, PharmD  [facility]  [address] [address]  Phone: [phone]  Fax: [phone]

## 2023-05-29 ENCOUNTER — PATIENT MESSAGE (OUTPATIENT)
Dept: RHEUMATOLOGY | Facility: CLINIC | Age: 38
End: 2023-05-29
Payer: COMMERCIAL

## 2023-06-19 ENCOUNTER — SPECIALTY PHARMACY (OUTPATIENT)
Dept: PHARMACY | Facility: CLINIC | Age: 38
End: 2023-06-19
Payer: COMMERCIAL

## 2023-06-19 NOTE — TELEPHONE ENCOUNTER
Specialty Pharmacy - Refill Coordination    Specialty Medication Orders Linked to Encounter      Flowsheet Row Most Recent Value   Medication #1 secukinumab (COSENTYX PEN, 2 PENS,) 150 mg/mL PnIj (Order#[id number], Rx#[id number])            Refill Questions - Documented Responses      Flowsheet Row Most Recent Value   Patient Availability and HIPAA Verification    Does patient want to proceed with activity? Yes   HIPAA/medical authority confirmed? Yes   Relationship to patient of person spoken to? Self   Refill Screening Questions    Changes to allergies? No   Changes to medications? No   New conditions since last clinic visit? No   Unplanned office visit, urgent care, ED, or hospital admission in the last 4 weeks? No   How does patient/caregiver feel medication is working? Good   Financial problems or insurance changes? No   How many doses of your specialty medications were missed in the last 4 weeks? 0   Would patient like to speak to a pharmacist? No   When does the patient need to receive the medication? 06/22/23   Refill Delivery Questions    How will the patient receive the medication? MEDRx   When does the patient need to receive the medication? 06/22/23   Shipping Address Home   Address in [facility] confirmed and updated if neccessary? Yes   Expected Copay ($) 0   Is the patient able to afford the medication copay? Yes   Payment Method zero copay   Days supply of Refill 28   Supplies needed? No supplies needed   Refill activity completed? Yes   Refill activity plan Refill scheduled   Shipment/Pickup Date: 06/20/23            Current Outpatient Medications   Medication Sig    ACCU-CHEK GUIDE GLUCOSE METER Misc USE TO CHECK BLOOD SUGAR    atomoxetine (STRATTERA) 25 MG capsule Take 25 mg by mouth.    DAYVIGO 10 mg Tab Take 1 tablet by mouth every evening.    folic acid (FOLVITE) 1 MG tablet Take 1 tablet (1 mg total) by mouth once daily.    methotrexate 2.5 MG Tab 3 PO q 7 days in the am and 3 PO 12 q 7  days in the pm 12 hours later    ondansetron (ZOFRAN) 4 MG tablet Take 1 tablet (4 mg total) by mouth every 8 (eight) hours as needed for Nausea.    secukinumab (COSENTYX PEN, 2 PENS,) 150 mg/mL PnIj Inject 300 mg (2 mL) into the skin every 28 days.    secukinumab (COSENTYX PEN, 2 PENS,) 150 mg/mL PnIj Inject 300 mg (2 mL) into the skin every 7 days for 5 doses, then inject 300 mg (2 mL) into the skin every 4 weeks thereafter.    semaglutide (RYBELSUS) 7 mg tablet Take 7 mg by mouth once daily.    TRINTELLIX 20 mg Tab Take 1 tablet by mouth.   Last reviewed on 5/10/2023  8:31 AM by PAMELA JeffriesC    Review of patient's allergies indicates:  No Known Allergies Last reviewed on  5/10/2023 8:31 AM by Lou Cotton      Tasks added this encounter   No tasks added.   Tasks due within next 3 months   6/19/2023 - Refill Coordination Outreach (1 time occurrence)     [facility]  [address] [place] [address]  Phone: [phone]  Fax: [phone]

## 2023-07-06 ENCOUNTER — TELEPHONE (OUTPATIENT)
Dept: RHEUMATOLOGY | Facility: CLINIC | Age: 38
End: 2023-07-06
Payer: COMMERCIAL

## 2023-07-11 ENCOUNTER — SPECIALTY PHARMACY (OUTPATIENT)
Dept: PHARMACY | Facility: CLINIC | Age: 38
End: 2023-07-11
Payer: COMMERCIAL

## 2023-07-11 NOTE — TELEPHONE ENCOUNTER
Specialty Pharmacy - Refill Coordination    Specialty Medication Orders Linked to Encounter      Flowsheet Row Most Recent Value   Medication #1 secukinumab (COSENTYX PEN, 2 PENS,) 150 mg/mL PnIj (Order#[id number], Rx#[id number])            Refill Questions - Documented Responses      Flowsheet Row Most Recent Value   Patient Availability and HIPAA Verification    Does patient want to proceed with activity? Yes   HIPAA/medical authority confirmed? Yes   Relationship to patient of person spoken to? Self   Refill Screening Questions    Changes to allergies? No   Changes to medications? No   New conditions since last clinic visit? No   Unplanned office visit, urgent care, ED, or hospital admission in the last 4 weeks? No   How does patient/caregiver feel medication is working? Good   Financial problems or insurance changes? No   How many doses of your specialty medications were missed in the last 4 weeks? 0   Would patient like to speak to a pharmacist? No   When does the patient need to receive the medication? 07/20/23   Refill Delivery Questions    How will the patient receive the medication? MEDRx   When does the patient need to receive the medication? 07/20/23   Shipping Address Home   Address in [facility] confirmed and updated if neccessary? Yes   Expected Copay ($) 0   Is the patient able to afford the medication copay? Yes   Payment Method zero copay   Days supply of Refill 28   Supplies needed? No supplies needed   Refill activity completed? Yes   Refill activity plan Refill scheduled   Shipment/Pickup Date: 07/18/23            Current Outpatient Medications   Medication Sig    ACCU-CHEK GUIDE GLUCOSE METER Misc USE TO CHECK BLOOD SUGAR    atomoxetine (STRATTERA) 25 MG capsule Take 25 mg by mouth.    DAYVIGO 10 mg Tab Take 1 tablet by mouth every evening.    folic acid (FOLVITE) 1 MG tablet Take 1 tablet (1 mg total) by mouth once daily.    methotrexate 2.5 MG Tab 3 PO q 7 days in the am and 3 PO 12 q 7  days in the pm 12 hours later    ondansetron (ZOFRAN) 4 MG tablet Take 1 tablet (4 mg total) by mouth every 8 (eight) hours as needed for Nausea.    secukinumab (COSENTYX PEN, 2 PENS,) 150 mg/mL PnIj Inject 300 mg (2 mL) into the skin every 28 days.    secukinumab (COSENTYX PEN, 2 PENS,) 150 mg/mL PnIj Inject 300 mg (2 mL) into the skin every 7 days for 5 doses, then inject 300 mg (2 mL) into the skin every 4 weeks thereafter.    semaglutide (RYBELSUS) 7 mg tablet Take 7 mg by mouth once daily.    TRINTELLIX 20 mg Tab Take 1 tablet by mouth.   Last reviewed on 5/10/2023  8:31 AM by PAMELA JeffriesC    Review of patient's allergies indicates:  No Known Allergies Last reviewed on  5/10/2023 8:31 AM by Lou Cotton      Tasks added this encounter   No tasks added.   Tasks due within next 3 months   7/11/2023 - Refill Coordination Outreach (1 time occurrence)     [facility]  [address] [place], [place] [address]  [facility] [address]  Phone: [phone]  Fax: [phone]

## 2023-10-10 ENCOUNTER — TELEPHONE (OUTPATIENT)
Dept: RHEUMATOLOGY | Facility: CLINIC | Age: 38
End: 2023-10-10
Payer: COMMERCIAL

## 2023-10-10 NOTE — TELEPHONE ENCOUNTER
----- Message from James Polo sent at 10/10/2023  4:35 PM CDT -----  Contact: [facility]  .Type:  RX Refill Request    Who Called: [facility]   Refill or New Rx:Refill   RX Name and Strength: secukinumab (COSENTYX PEN, 2 PENS,) 150 mg/mL PnIj    How is the patient currently taking it? (ex. 1XDay):as prescribed   Is this a 30 day or 90 day RX: .30  Preferred Pharmacy with phone number:  .  [facility]   Phone # [phone]  FAX# [phone]       Ordering Provider:Lula   Would the patient rather a call back or a response via [facility]? Call   Best Call Back Number: .[phone]

## 2023-10-12 ENCOUNTER — TELEPHONE (OUTPATIENT)
Dept: RHEUMATOLOGY | Facility: CLINIC | Age: 38
End: 2023-10-12
Payer: COMMERCIAL

## 2023-10-12 NOTE — TELEPHONE ENCOUNTER
----- Message from Stefania Chester sent at 10/12/2023 11:54 AM CDT -----  Contact: [facility] spec-[phone]    Pharmacy is calling to clarify an RX.[facility]-.Fax # [phone]    RX name:  secukinumab (COSENTYX PEN, 2 PENS,) 150 mg/mL PnIj      What do they need to clarify: The pharmacy is requesting a refill on the current prescription for two     pens every four weeks.    Comments: Please call the pharmacy back to advise.

## 2023-10-17 DIAGNOSIS — L40.0 PLAQUE PSORIASIS: ICD-10-CM

## 2023-10-17 DIAGNOSIS — L40.50 PSORIATIC ARTHRITIS: ICD-10-CM

## 2023-10-17 RX ORDER — SECUKINUMAB 150 MG/ML
300 INJECTION SUBCUTANEOUS
Qty: 2 ML | Refills: 0 | Status: SHIPPED | OUTPATIENT
Start: 2023-10-17

## 2024-06-18 ENCOUNTER — OFFICE VISIT (OUTPATIENT)
Dept: URBAN - METROPOLITAN AREA CLINIC 40 | Facility: CLINIC | Age: 39
End: 2024-06-18
Payer: COMMERCIAL

## 2024-06-18 ENCOUNTER — DASHBOARD ENCOUNTERS (OUTPATIENT)
Age: 39
End: 2024-06-18

## 2024-06-18 ENCOUNTER — LAB OUTSIDE AN ENCOUNTER (OUTPATIENT)
Dept: URBAN - METROPOLITAN AREA CLINIC 40 | Facility: CLINIC | Age: 39
End: 2024-06-18

## 2024-06-18 VITALS
DIASTOLIC BLOOD PRESSURE: 82 MMHG | BODY MASS INDEX: 32.59 KG/M2 | HEIGHT: 71 IN | TEMPERATURE: 97.3 F | SYSTOLIC BLOOD PRESSURE: 148 MMHG | OXYGEN SATURATION: 97 % | HEART RATE: 124 BPM | WEIGHT: 232.8 LBS

## 2024-06-18 DIAGNOSIS — R10.13 DYSPEPSIA: ICD-10-CM

## 2024-06-18 DIAGNOSIS — R13.12 OROPHARYNGEAL DYSPHAGIA: ICD-10-CM

## 2024-06-18 PROBLEM — 71457002: Status: ACTIVE | Noted: 2024-06-18

## 2024-06-18 PROCEDURE — 99203 OFFICE O/P NEW LOW 30 MIN: CPT | Performed by: PHYSICIAN ASSISTANT

## 2024-06-18 RX ORDER — SECUKINUMAB 150 MG/ML
AS DIRECTED INJECTION SUBCUTANEOUS
Status: ACTIVE | COMMUNITY

## 2024-06-18 RX ORDER — CELECOXIB 200 MG/1
1 CAPSULE WITH FOOD CAPSULE ORAL ONCE A DAY
Status: ACTIVE | COMMUNITY

## 2024-06-18 RX ORDER — HYDROXYZINE HYDROCHLORIDE 10 MG/1
1 TABLET AS NEEDED TABLET, FILM COATED ORAL ONCE A DAY
Status: ACTIVE | COMMUNITY

## 2024-06-18 RX ORDER — DEXTROAMPHETAMINE SACCHARATE, AMPHETAMINE ASPARTATE, DEXTROAMPHETAMINE SULFATE, AND AMPHETAMINE SULFATE 7.5; 7.5; 7.5; 7.5 MG/1; MG/1; MG/1; MG/1
1 TABLET TABLET ORAL TWICE A DAY
Status: ACTIVE | COMMUNITY

## 2024-06-18 RX ORDER — PANTOPRAZOLE SODIUM 40 MG/1
1 TABLET TABLET, DELAYED RELEASE ORAL ONCE A DAY
Qty: 90 TABLET | Refills: 3 | OUTPATIENT
Start: 2024-06-18

## 2024-06-18 RX ORDER — ARIPIPRAZOLE 2 MG/1
1 TABLET TABLET ORAL ONCE A DAY
Status: ACTIVE | COMMUNITY

## 2024-06-18 NOTE — HPI-TODAY'S VISIT:
The patient is 38-year-old male with past medical history as noted below is presenting to our clinic today with complaint of long history of recurrent difficulty swallowing solid food. He stated he has been having the symptoms on and off for many years with sensation of difficulty initiating swallowing. He denies difficulty swallowing liquid. He has a long history of dyspepsia and he respond to over-the-counter medications. He has not seen by GI physician in the past and no GI workup including EGD in the past. He has been taking Ibuprofen daily for toothache recently.    -- The patient denies dyspepsia, hemoptysis, hematemesis, nausea, vomiting, regurgitation, melena, constipation, diarrhea, abdominal pain, hematochezia, fever, chills, chest pain, SOB, or any other GI complaints today.   -- The patient denies ETOH, Tobacco, and Illicit drug use.   -- The patient is up to date with Flu and COVID vaccine.  -- Admits to take NSAID's.

## 2024-06-19 ENCOUNTER — OFFICE VISIT (OUTPATIENT)
Dept: URBAN - METROPOLITAN AREA CLINIC 74 | Facility: CLINIC | Age: 39
End: 2024-06-19

## 2024-06-19 NOTE — HPI-TODAY'S VISIT:
The patient is 38-year-old male with past medical history as noted below is presenting to our clinic today with complaint of long history of recurrent difficulty swallowing solid food.  He stated he has been having the symptoms on and off for many years with sensation of difficulty initiating swallowing.  He denies difficulty swallowing liquid.  He has a long history of dyspepsia and he respond to over-the-counter medications.  He has not seen by GI physician in the past and no GI workup including EGD in the past.

## 2024-07-09 ENCOUNTER — OFFICE VISIT (OUTPATIENT)
Dept: URBAN - METROPOLITAN AREA SURGERY CENTER 30 | Facility: SURGERY CENTER | Age: 39
End: 2024-07-09

## 2024-08-06 ENCOUNTER — OFFICE VISIT (OUTPATIENT)
Dept: URBAN - METROPOLITAN AREA CLINIC 74 | Facility: CLINIC | Age: 39
End: 2024-08-06

## 2024-08-06 NOTE — HPI-TODAY'S VISIT:
The patient is 39-year-old male with past medical history as noted below known to Dr. Joaquin is presenting to our clinic today to discuss his recent EGD results.